# Patient Record
Sex: FEMALE | Race: WHITE | NOT HISPANIC OR LATINO | URBAN - METROPOLITAN AREA
[De-identification: names, ages, dates, MRNs, and addresses within clinical notes are randomized per-mention and may not be internally consistent; named-entity substitution may affect disease eponyms.]

---
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
## 2021-12-14 ENCOUNTER — APPOINTMENT (RX ONLY)
Dept: URBAN - METROPOLITAN AREA CLINIC 23 | Facility: CLINIC | Age: 21
Setting detail: DERMATOLOGY
End: 2021-12-14

## 2021-12-14 DIAGNOSIS — L57.8 OTHER SKIN CHANGES DUE TO CHRONIC EXPOSURE TO NONIONIZING RADIATION: ICD-10-CM

## 2021-12-14 DIAGNOSIS — D22 MELANOCYTIC NEVI: ICD-10-CM

## 2021-12-14 DIAGNOSIS — L73.2 HIDRADENITIS SUPPURATIVA: ICD-10-CM

## 2021-12-14 DIAGNOSIS — Z71.89 OTHER SPECIFIED COUNSELING: ICD-10-CM

## 2021-12-14 PROBLEM — D22.0 MELANOCYTIC NEVI OF LIP: Status: ACTIVE | Noted: 2021-12-14

## 2021-12-14 PROBLEM — D22.5 MELANOCYTIC NEVI OF TRUNK: Status: ACTIVE | Noted: 2021-12-14

## 2021-12-14 PROBLEM — D22.61 MELANOCYTIC NEVI OF RIGHT UPPER LIMB, INCLUDING SHOULDER: Status: ACTIVE | Noted: 2021-12-14

## 2021-12-14 PROBLEM — D22.62 MELANOCYTIC NEVI OF LEFT UPPER LIMB, INCLUDING SHOULDER: Status: ACTIVE | Noted: 2021-12-14

## 2021-12-14 PROBLEM — D22.4 MELANOCYTIC NEVI OF SCALP AND NECK: Status: ACTIVE | Noted: 2021-12-14

## 2021-12-14 PROCEDURE — ? OBSERVATION AND MEASURE

## 2021-12-14 PROCEDURE — 99214 OFFICE O/P EST MOD 30 MIN: CPT

## 2021-12-14 PROCEDURE — ? PRESCRIPTION

## 2021-12-14 PROCEDURE — ? COUNSELING

## 2021-12-14 RX ORDER — DOXYCYCLINE HYCLATE 100 MG/1
CAPSULE, GELATIN COATED ORAL
Qty: 60 | Refills: 5 | Status: ERX | COMMUNITY
Start: 2021-12-14

## 2021-12-14 RX ADMIN — DOXYCYCLINE HYCLATE: 100 CAPSULE, GELATIN COATED ORAL at 00:00

## 2021-12-14 ASSESSMENT — LOCATION ZONE DERM
LOCATION ZONE: LEG
LOCATION ZONE: HAND
LOCATION ZONE: LIP
LOCATION ZONE: TRUNK
LOCATION ZONE: SCALP
LOCATION ZONE: ARM

## 2021-12-14 ASSESSMENT — LOCATION SIMPLE DESCRIPTION DERM
LOCATION SIMPLE: LEFT UPPER BACK
LOCATION SIMPLE: RIGHT LIP
LOCATION SIMPLE: SCALP
LOCATION SIMPLE: RIGHT THIGH
LOCATION SIMPLE: LEFT HAND
LOCATION SIMPLE: RIGHT SHOULDER
LOCATION SIMPLE: LEFT INGUINAL FOLD
LOCATION SIMPLE: LEFT LOWER EXTREMITY
LOCATION SIMPLE: RIGHT FOREARM
LOCATION SIMPLE: RIGHT INGUINAL FOLD

## 2021-12-14 ASSESSMENT — LOCATION DETAILED DESCRIPTION DERM
LOCATION DETAILED: LEFT MEDIAL THIGH
LOCATION DETAILED: LEFT CENTRAL FRONTAL SCALP
LOCATION DETAILED: RIGHT POSTERIOR SHOULDER
LOCATION DETAILED: LEFT SUPERIOR UPPER BACK
LOCATION DETAILED: LEFT INGUINAL FOLD
LOCATION DETAILED: RIGHT INGUINAL FOLD
LOCATION DETAILED: LEFT RADIAL DORSAL HAND
LOCATION DETAILED: RIGHT ANTERIOR PROXIMAL THIGH
LOCATION DETAILED: RIGHT PROXIMAL RADIAL DORSAL FOREARM
LOCATION DETAILED: RIGHT INFERIOR VERMILION LIP

## 2021-12-14 NOTE — PROCEDURE: COUNSELING
Detail Level: Zone
Detail Level: Detailed
Detail Level: Generalized
Patient Specific Counseling (Will Not Stick From Patient To Patient): Disc often needs multiple Tx modalities. Abx, humira, topicals, injections, laser hair removal, etc. Pt would like to start joycelyn Worrell

## 2022-01-14 ENCOUNTER — HOSPITAL ENCOUNTER (OUTPATIENT)
Dept: SLEEP MEDICINE | Age: 22
Discharge: HOME | End: 2022-01-14
Payer: COMMERCIAL

## 2022-01-14 PROCEDURE — 95806 SLEEP STUDY UNATT&RESP EFFT: CPT

## 2022-02-07 PROBLEM — G47.10 HYPERSOMNIA: Status: ACTIVE | Noted: 2022-02-07

## 2022-02-07 PROBLEM — G47.34 NOCTURNAL HYPOXEMIA: Status: ACTIVE | Noted: 2022-02-07

## 2022-03-18 PROBLEM — G43.909 MIGRAINES: Status: ACTIVE | Noted: 2020-01-01

## 2022-03-18 PROBLEM — G47.10 HYPERSOMNIA: Status: ACTIVE | Noted: 2022-02-07

## 2022-03-20 PROBLEM — G47.34 NOCTURNAL HYPOXEMIA: Status: ACTIVE | Noted: 2022-02-07

## 2022-06-09 NOTE — PROGRESS NOTES
Renny Deawyatt Corral, [address] [place] [address] Court, [address] [facility]  [phone]    Patient Name:  Jerry Curran  YOB: 2000      Office Visit 6/10/2022    CHIEF COMPLAINT:    Chief Complaint   Patient presents with    Sleep Apnea         HISTORY OF PRESENT ILLNESS:  Patient is a 24 yo female seen today for follow up of sleep apnea and hypersomnia. She was diagnosed with sleep apnea a few years ago but had not used Pap therapy for about two years. Home sleep test completed 1/16/2022 with AHI 15.3 events per hour with desaturations to 85%. She was started on auto CPAP set to a pressure of 7 to 15 cm. Download reveals 84% compliance on therapy with average nightly use 4.5 hours. AHI is down to 2.5 events per hour. She is using an average pressure between nine and 11 cm. She has noticed some improvement and daytime sleepiness. Millburn score is down to seven. It was 11 at last visit. She has noticed that she is no longer waking in the night to go to the bathroom. Prior to CPAP, she was waking four times nightly with nocturia. She naps on average once per week for about an hour. She would like to continue Pap therapy with a full facemask. She is taking the mask off in the night not knowing she is doing this. She does have a leak occasionally. She sleeps laterally. We discussed the CPAP pillow may help. Will adjust pressure to APAP 7 to 10 cm to see if this will help with leak and being able to keep her machine on.                  Past Medical History:   Diagnosis Date    Acid reflux 2010    Anxiety and depression     f/w psych    Gastroparesis 2017    Hives     Migraines 2020    resolved on their own 2021    ZEKE (obstructive sleep apnea)     moderate 1/2022. f/w sleep med         Patient Active Problem List   Diagnosis    ZEKE (obstructive sleep apnea)    Hypersomnia    Migraines    Acid reflux    Anxiety and depression    Hypercholesterolemia    Nocturnal hypoxemia          Past Surgical History:   Procedure Laterality Date    CHOLECYSTECTOMY, LAPAROSCOPIC  02/2017           Social History     Socioeconomic History    Marital status: Single     Spouse name: Not on file    Number of children: Not on file    Years of education: Not on file    Highest education level: Not on file   Occupational History    Not on file   Tobacco Use    Smoking status: Never Smoker    Smokeless tobacco: Never Used   Substance and Sexual Activity    Alcohol use: Not on file    Drug use: Not on file    Sexual activity: Not on file   Other Topics Concern    Not on file   Social History Narrative    Not on file     Social Determinants of Health     Financial Resource Strain:     Difficulty of Paying Living Expenses: Not on file   Food Insecurity:     Worried About Running Out of Food in the Last Year: Not on file    Mami of Food in the Last Year: Not on file   Transportation Needs:     Lack of Transportation (Medical): Not on file    Lack of Transportation (Non-Medical):  Not on file   Physical Activity:     Days of Exercise per Week: Not on file    Minutes of Exercise per Session: Not on file   Stress:     Feeling of Stress : Not on file   Social Connections:     Frequency of Communication with Friends and Family: Not on file    Frequency of Social Gatherings with Friends and Family: Not on file    Attends Cheondoism Services: Not on file    Active Member of [address] [place] [address] [employer] or Organizations: Not on file    Attends Club or Organization Meetings: Not on file    Marital Status: Not on file   Intimate Partner Violence:     Fear of Current or Ex-Partner: Not on file    Emotionally Abused: Not on file    Physically Abused: Not on file    Sexually Abused: Not on file   Housing Stability:     Unable to Pay for Housing in the Last Year: Not on file    Number of [place] in the Last Year: Not on file    Unstable Housing in the Last Year: Not on file         Family History   Problem Relation Age of Onset    Migraines Mother     Hypertension Father     Migraines Sister          No Known Allergies      Current Outpatient Medications   Medication Sig    buPROPion (WELLBUTRIN XL) 300 MG extended release tablet Take 300 mg by mouth daily    doxycycline hyclate (VIBRAMYCIN) 100 MG capsule Take 100 mg by mouth daily    norethindrone-ethinyl estradiol (JUNEL FE 1/20) 1-20 MG-MCG per tablet Take 1 tablet by mouth daily    RABEprazole (ACIPHEX) 20 MG tablet Take 20 mg by mouth 2 times daily     No current facility-administered medications for this visit. REVIEW OF SYSTEMS:   CONSTITUTIONAL:   There is no history of fever, chills, night sweats, weight loss, weight gain, persistent fatigue, or lethargy/hypersomnolence. CARDIAC:   No chest pain, pressure, discomfort, palpitations, orthopnea, murmurs, or edema. GI:   No dysphagia, heartburn reflux, nausea/vomiting, diarrhea, abdominal pain, or bleeding. NEURO:   There is no history of AMS, persistent headache, decreased level of consciousness, seizures, or motor or sensory deficits. PHYSICAL EXAM:    Vitals:    06/10/22 1140   BP: 118/84   Pulse: 90   Resp: 14   Temp: 97.9 °F (36.6 °C)   SpO2: 97%   Weight: 248 lb (112.5 kg)   Height: 5' 10\" (1.778 m)   BMI 35.58          GENERAL APPEARANCE:   The patient is obese and in no respiratory distress. HEENT:   PERRL. Conjunctivae unremarkable. Nasal mucosa is without epistaxis, exudate, or polyps. Gums and dentition are unremarkable. There is  oropharyngeal narrowing. TMs are clear. NECK/LYMPHATIC:   Symmetrical with no elevation of jugular venous pulsation. Trachea midline. No thyroid enlargement. No cervical adenopathy. LUNGS:   Normal respiratory effort with symmetrical lung expansion. Breath sounds clear bilaterally. HEART:   There is a regular rate and rhythm. No murmur, rub, or gallop. There is no edema in the lower extremities. ABDOMEN:   Soft and non-tender.   No hepatosplenomegaly. Bowel sounds are normal.     NEURO:   The patient is alert and oriented to person, place, and time. Memory appears intact and mood is normal.  No gross sensorimotor deficits are present. ASSESSMENT:  (Medical Decision Making)      Diagnosis Orders   1. ZEKE (obstructive sleep apnea) patient is using and benefiting from Pap therapy. Will adjust pressure to help with leak which should help her to be able to keep the mask on during the night. Continue nightly compliance  DME - DURABLE MEDICAL EQUIPMENT   2. Nocturnal hypoxemia -- should improved with Pap therapy  DME - DURABLE MEDICAL EQUIPMENT        PLAN:  Change pressure to APAP 7-10cm   Recommended cpap pillow for side sleep with full facemask  continue nightly compliance  follow-up will be in six months or sooner if needed        Orders Placed This Encounter   Procedures    DME - DURABLE MEDICAL EQUIPMENT     medbridge  Change pressure to APAP 7-10 cm    [facility]  Phone: [address] [address]  Dept: [phone]      Patient Name: Candi Pearson  : 2000  Gender: female  Address: [address] [place] [address]  [address]  Patient phone: [phone] (home)       Primary Insurance: Payor: Jessi Haider 150 / Plan: Abhijit Lunch / Product Type: *No Product type* /   Subscriber ID: [id number] - ([facility])      AMB Supply Order  Order Details     DME Location:   Order Date: 6/10/2022   There were no encounter diagnoses.              (  X   )Supplies Needed       apap Machine   (     ) CPAP Unit  (     ) Auto CPAP Unit  (     ) BiLevel Unit  (     ) Auto BiLevel Unit  (     ) ASV   (     ) Bilevel ST      Length of need: 12 months    Pressure:  7-10 cmH20  EPR:      Starting Ramp Pressure:   cm H20  Ramp Time: min      Patient had a diagnostic Apnea Hypopnea Index (AHI) of :    *SUPPLIES* Replace all as needed, or per coverage guidelines     Masks Type:  ( x   ) -Full Face Mask (1 per 3 mon)  (  x  ) -Full Mask (1 per month) Interface/Cushion      (  ) -Nasal Mask (1 per 3 mon)  (  ) - Nasal Mask (1 per month) Interface/Cushion  (     ) -Pillow (2 per mon)  (     ) -Jymzyontd (1 per 6 mon)            Other Supplies:    (   X  )-Xybndppq (1 per 6 mon)  (   X  )-Uifqmi Tubing (1 per 3 mon)  (   X  )- Disposable Filter (2 per mon)  (   x  )-Eeahfl Humidifier (1 per year)     ( x    )-Wnqsrsjzf (sometimes used with Full Face Mask) (1 per 6 mos)  (    )-Tubing-without heat (1 per 3 mos)  (     )-Non-Disposable Filter (1 per 6 mos)  (  x   )-Water Chamber (1 per 6 mos)  (     )-Humidifier non-heated (1 per 5 yrs)      Signed Date: 6/10/2022  Electronically Signed By: JOSE Christy CNP  Electronically Dated:  6/10/2022     No orders of the defined types were placed in this encounter. Collaborating Physician: Dr. Marie Atwood    Over 50% of today's office visit was spent in face to face time reviewing test results, prognosis, importance of compliance, education about disease process, benefits of medications, instructions for management of acute flare-ups, and follow up plans. Total face to face time spent with patient was 25 minutes.         JOSE Christy CNP  Electronically signed

## 2022-06-10 ENCOUNTER — OFFICE VISIT (OUTPATIENT)
Dept: SLEEP MEDICINE | Age: 22
End: 2022-06-10
Payer: COMMERCIAL

## 2022-06-10 VITALS
BODY MASS INDEX: 35.5 KG/M2 | SYSTOLIC BLOOD PRESSURE: 118 MMHG | HEIGHT: 70 IN | DIASTOLIC BLOOD PRESSURE: 84 MMHG | WEIGHT: 248 LBS | HEART RATE: 90 BPM | RESPIRATION RATE: 14 BRPM | TEMPERATURE: 97.9 F | OXYGEN SATURATION: 97 %

## 2022-06-10 DIAGNOSIS — G47.33 OSA (OBSTRUCTIVE SLEEP APNEA): Primary | ICD-10-CM

## 2022-06-10 DIAGNOSIS — G47.34 NOCTURNAL HYPOXEMIA: ICD-10-CM

## 2022-06-10 PROCEDURE — 99213 OFFICE O/P EST LOW 20 MIN: CPT | Performed by: NURSE PRACTITIONER

## 2022-06-10 ASSESSMENT — SLEEP AND FATIGUE QUESTIONNAIRES
HOW LIKELY ARE YOU TO NOD OFF OR FALL ASLEEP WHILE SITTING QUIETLY AFTER LUNCH WITHOUT ALCOHOL: 1
HOW LIKELY ARE YOU TO NOD OFF OR FALL ASLEEP WHILE WATCHING TV: 0
HOW LIKELY ARE YOU TO NOD OFF OR FALL ASLEEP WHEN YOU ARE A PASSENGER IN A CAR FOR AN HOUR WITHOUT A BREAK: 3
ESS TOTAL SCORE: 7
HOW LIKELY ARE YOU TO NOD OFF OR FALL ASLEEP WHILE SITTING AND READING: 0
HOW LIKELY ARE YOU TO NOD OFF OR FALL ASLEEP WHILE SITTING AND TALKING TO SOMEONE: 0
HOW LIKELY ARE YOU TO NOD OFF OR FALL ASLEEP WHILE LYING DOWN TO REST IN THE AFTERNOON WHEN CIRCUMSTANCES PERMIT: 2
HOW LIKELY ARE YOU TO NOD OFF OR FALL ASLEEP WHILE SITTING INACTIVE IN A PUBLIC PLACE: 1
HOW LIKELY ARE YOU TO NOD OFF OR FALL ASLEEP IN A CAR, WHILE STOPPED FOR A FEW MINUTES IN TRAFFIC: 0

## 2022-06-14 ENCOUNTER — APPOINTMENT (RX ONLY)
Dept: URBAN - METROPOLITAN AREA CLINIC 23 | Facility: CLINIC | Age: 22
Setting detail: DERMATOLOGY
End: 2022-06-14

## 2022-06-14 DIAGNOSIS — Z71.89 OTHER SPECIFIED COUNSELING: ICD-10-CM

## 2022-06-14 DIAGNOSIS — L57.8 OTHER SKIN CHANGES DUE TO CHRONIC EXPOSURE TO NONIONIZING RADIATION: ICD-10-CM

## 2022-06-14 DIAGNOSIS — L73.2 HIDRADENITIS SUPPURATIVA: ICD-10-CM

## 2022-06-14 DIAGNOSIS — D22 MELANOCYTIC NEVI: ICD-10-CM

## 2022-06-14 PROBLEM — D22.5 MELANOCYTIC NEVI OF TRUNK: Status: ACTIVE | Noted: 2022-06-14

## 2022-06-14 PROBLEM — D22.62 MELANOCYTIC NEVI OF LEFT UPPER LIMB, INCLUDING SHOULDER: Status: ACTIVE | Noted: 2022-06-14

## 2022-06-14 PROBLEM — D22.4 MELANOCYTIC NEVI OF SCALP AND NECK: Status: ACTIVE | Noted: 2022-06-14

## 2022-06-14 PROBLEM — D22.61 MELANOCYTIC NEVI OF RIGHT UPPER LIMB, INCLUDING SHOULDER: Status: ACTIVE | Noted: 2022-06-14

## 2022-06-14 PROBLEM — D22.0 MELANOCYTIC NEVI OF LIP: Status: ACTIVE | Noted: 2022-06-14

## 2022-06-14 PROCEDURE — 99213 OFFICE O/P EST LOW 20 MIN: CPT

## 2022-06-14 PROCEDURE — ? OTHER

## 2022-06-14 PROCEDURE — ? COUNSELING

## 2022-06-14 PROCEDURE — ? OBSERVATION AND MEASURE

## 2022-06-14 ASSESSMENT — LOCATION DETAILED DESCRIPTION DERM
LOCATION DETAILED: LEFT RADIAL DORSAL HAND
LOCATION DETAILED: LEFT SUPERIOR UPPER BACK
LOCATION DETAILED: RIGHT INGUINAL FOLD
LOCATION DETAILED: LEFT MEDIAL THIGH
LOCATION DETAILED: RIGHT PROXIMAL RADIAL DORSAL FOREARM
LOCATION DETAILED: LEFT CENTRAL FRONTAL SCALP
LOCATION DETAILED: RIGHT INFERIOR VERMILION LIP
LOCATION DETAILED: LEFT INGUINAL FOLD
LOCATION DETAILED: RIGHT POSTERIOR SHOULDER

## 2022-06-14 ASSESSMENT — LOCATION ZONE DERM
LOCATION ZONE: ARM
LOCATION ZONE: SCALP
LOCATION ZONE: LEG
LOCATION ZONE: TRUNK
LOCATION ZONE: HAND
LOCATION ZONE: LIP

## 2022-06-14 ASSESSMENT — LOCATION SIMPLE DESCRIPTION DERM
LOCATION SIMPLE: LEFT INGUINAL FOLD
LOCATION SIMPLE: RIGHT SHOULDER
LOCATION SIMPLE: RIGHT INGUINAL FOLD
LOCATION SIMPLE: RIGHT LIP
LOCATION SIMPLE: RIGHT FOREARM
LOCATION SIMPLE: LEFT LOWER EXTREMITY
LOCATION SIMPLE: SCALP
LOCATION SIMPLE: LEFT UPPER BACK
LOCATION SIMPLE: LEFT HAND

## 2022-06-14 NOTE — PROCEDURE: OTHER
Detail Level: Simple
Render Risk Assessment In Note?: no
Other (Free Text): Doing well, per pt. Will call if needs refills
Note Text (......Xxx Chief Complaint.): This diagnosis correlates with the

## 2022-11-15 ENCOUNTER — APPOINTMENT (RX ONLY)
Dept: URBAN - METROPOLITAN AREA CLINIC 23 | Facility: CLINIC | Age: 22
Setting detail: DERMATOLOGY
End: 2022-11-15

## 2022-11-15 DIAGNOSIS — Q819 OTHER SPECIFIED ANOMALIES OF SKIN: ICD-10-CM

## 2022-11-15 DIAGNOSIS — Q826 OTHER SPECIFIED ANOMALIES OF SKIN: ICD-10-CM

## 2022-11-15 DIAGNOSIS — L57.8 OTHER SKIN CHANGES DUE TO CHRONIC EXPOSURE TO NONIONIZING RADIATION: ICD-10-CM

## 2022-11-15 DIAGNOSIS — Q828 OTHER SPECIFIED ANOMALIES OF SKIN: ICD-10-CM

## 2022-11-15 DIAGNOSIS — Z71.89 OTHER SPECIFIED COUNSELING: ICD-10-CM

## 2022-11-15 DIAGNOSIS — L73.2 HIDRADENITIS SUPPURATIVA: ICD-10-CM

## 2022-11-15 DIAGNOSIS — D22 MELANOCYTIC NEVI: ICD-10-CM

## 2022-11-15 PROBLEM — D22.5 MELANOCYTIC NEVI OF TRUNK: Status: ACTIVE | Noted: 2022-11-15

## 2022-11-15 PROBLEM — D22.61 MELANOCYTIC NEVI OF RIGHT UPPER LIMB, INCLUDING SHOULDER: Status: ACTIVE | Noted: 2022-11-15

## 2022-11-15 PROBLEM — Q82.8 OTHER SPECIFIED CONGENITAL MALFORMATIONS OF SKIN: Status: ACTIVE | Noted: 2022-11-15

## 2022-11-15 PROBLEM — D22.4 MELANOCYTIC NEVI OF SCALP AND NECK: Status: ACTIVE | Noted: 2022-11-15

## 2022-11-15 PROBLEM — D22.0 MELANOCYTIC NEVI OF LIP: Status: ACTIVE | Noted: 2022-11-15

## 2022-11-15 PROBLEM — D22.62 MELANOCYTIC NEVI OF LEFT UPPER LIMB, INCLUDING SHOULDER: Status: ACTIVE | Noted: 2022-11-15

## 2022-11-15 PROCEDURE — ? OBSERVATION AND MEASURE

## 2022-11-15 PROCEDURE — 99213 OFFICE O/P EST LOW 20 MIN: CPT

## 2022-11-15 PROCEDURE — ? COUNSELING

## 2022-11-15 PROCEDURE — ? OTHER

## 2022-11-15 ASSESSMENT — LOCATION ZONE DERM
LOCATION ZONE: SCALP
LOCATION ZONE: LIP
LOCATION ZONE: ARM
LOCATION ZONE: HAND
LOCATION ZONE: LEG
LOCATION ZONE: FACE
LOCATION ZONE: TRUNK

## 2022-11-15 ASSESSMENT — LOCATION SIMPLE DESCRIPTION DERM
LOCATION SIMPLE: LEFT SHOULDER
LOCATION SIMPLE: LEFT UPPER BACK
LOCATION SIMPLE: RIGHT SHOULDER
LOCATION SIMPLE: LEFT HAND
LOCATION SIMPLE: SCALP
LOCATION SIMPLE: RIGHT INGUINAL FOLD
LOCATION SIMPLE: RIGHT FOREARM
LOCATION SIMPLE: LEFT LOWER EXTREMITY
LOCATION SIMPLE: LEFT INGUINAL FOLD
LOCATION SIMPLE: RIGHT LIP
LOCATION SIMPLE: LEFT CHEEK

## 2022-11-15 ASSESSMENT — LOCATION DETAILED DESCRIPTION DERM
LOCATION DETAILED: LEFT CENTRAL MALAR CHEEK
LOCATION DETAILED: RIGHT POSTERIOR SHOULDER
LOCATION DETAILED: RIGHT INFERIOR VERMILION LIP
LOCATION DETAILED: LEFT CENTRAL FRONTAL SCALP
LOCATION DETAILED: LEFT RADIAL DORSAL HAND
LOCATION DETAILED: LEFT MEDIAL THIGH
LOCATION DETAILED: LEFT INGUINAL FOLD
LOCATION DETAILED: LEFT POSTERIOR SHOULDER
LOCATION DETAILED: RIGHT PROXIMAL RADIAL DORSAL FOREARM
LOCATION DETAILED: RIGHT INGUINAL FOLD
LOCATION DETAILED: LEFT SUPERIOR UPPER BACK

## 2022-11-15 NOTE — PROCEDURE: COUNSELING
Detail Level: Detailed
Detail Level: Zone
Patient Specific Counseling (Will Not Stick From Patient To Patient): Samples given: ceraveSA, amlactin, eucerin dry bumpy cream, laroche posay moisturizing cream, eletone
Detail Level: Generalized

## 2022-12-12 ENCOUNTER — TELEMEDICINE (OUTPATIENT)
Dept: FAMILY MEDICINE CLINIC | Facility: CLINIC | Age: 22
End: 2022-12-12
Payer: COMMERCIAL

## 2022-12-12 DIAGNOSIS — K21.9 GASTRO-ESOPHAGEAL REFLUX DISEASE WITHOUT ESOPHAGITIS: Primary | ICD-10-CM

## 2022-12-12 PROCEDURE — 99214 OFFICE O/P EST MOD 30 MIN: CPT | Performed by: FAMILY MEDICINE

## 2022-12-12 RX ORDER — RABEPRAZOLE SODIUM 20 MG/1
20 TABLET, DELAYED RELEASE ORAL DAILY
Qty: 90 TABLET | Refills: 0 | Status: SHIPPED | OUTPATIENT
Start: 2022-12-12

## 2022-12-12 RX ORDER — FAMOTIDINE 40 MG/1
40 TABLET, FILM COATED ORAL EVERY EVENING
Qty: 90 TABLET | Refills: 0 | Status: SHIPPED | OUTPATIENT
Start: 2022-12-12

## 2022-12-12 ASSESSMENT — PATIENT HEALTH QUESTIONNAIRE - PHQ9
10. IF YOU CHECKED OFF ANY PROBLEMS, HOW DIFFICULT HAVE THESE PROBLEMS MADE IT FOR YOU TO DO YOUR WORK, TAKE CARE OF THINGS AT HOME, OR GET ALONG WITH OTHER PEOPLE: 0
1. LITTLE INTEREST OR PLEASURE IN DOING THINGS: 0
SUM OF ALL RESPONSES TO PHQ QUESTIONS 1-9: 0
4. FEELING TIRED OR HAVING LITTLE ENERGY: 0
2. FEELING DOWN, DEPRESSED OR HOPELESS: 0
SUM OF ALL RESPONSES TO PHQ9 QUESTIONS 1 & 2: 0
9. THOUGHTS THAT YOU WOULD BE BETTER OFF DEAD, OR OF HURTING YOURSELF: 0
3. TROUBLE FALLING OR STAYING ASLEEP: 0
7. TROUBLE CONCENTRATING ON THINGS, SUCH AS READING THE NEWSPAPER OR WATCHING TELEVISION: 0
SUM OF ALL RESPONSES TO PHQ QUESTIONS 1-9: 0
SUM OF ALL RESPONSES TO PHQ QUESTIONS 1-9: 0
5. POOR APPETITE OR OVEREATING: 0
6. FEELING BAD ABOUT YOURSELF - OR THAT YOU ARE A FAILURE OR HAVE LET YOURSELF OR YOUR FAMILY DOWN: 0
8. MOVING OR SPEAKING SO SLOWLY THAT OTHER PEOPLE COULD HAVE NOTICED. OR THE OPPOSITE, BEING SO FIGETY OR RESTLESS THAT YOU HAVE BEEN MOVING AROUND A LOT MORE THAN USUAL: 0
SUM OF ALL RESPONSES TO PHQ QUESTIONS 1-9: 0

## 2022-12-12 NOTE — PROGRESS NOTES
Lila Amaya is a 25 y.o. female who was seen by synchronous (real-time) audio-video technology on 12/12/2022. Subjective:   Lila Amaya was seen for Gastroesophageal Reflux (Wanting a referral to gastro)  Extensive GI hx. Last seen by GI 10/2019  HH and ulcer in the 4th grade  Cholecystectomy 2017  Gastroparesis at [address] [facility], had botox 8/2018    For the past 1.5 months is having really bad GERD regardless of what she eats or drinks. Is having a \"knot\" sensation behind her sternum after she eats and lasts all day. Currently taking aciphex once day b/c insurance doesn't cover BID  No vomiting or regurgitation of food. No Known Allergies      Objective:     General: alert, cooperative, and no distress   Mental  status: mental status: alert, oriented to person, place, and time, normal mood, behavior, speech, dress, motor activity, and thought processes   Resp: No distress. Neuro: No acute deficits   Skin: skin: no discoloration or lesions of concern on visible areas     Due to this being a TeleHealth evaluation, many elements of the physical examination are unable to be assessed. Assessment & Plan:   Maranda Argueta was seen today for gastroesophageal reflux. Diagnoses and all orders for this visit:    Gastro-esophageal reflux disease without esophagitis  -     RABEprazole (ACIPHEX) 20 MG tablet; Take 1 tablet by mouth daily  -     famotidine (PEPCID) 40 MG tablet; Take 1 tablet by mouth every evening  -     [facility]    Add pepcid qhs. Referral to GI. Reviewed GERD diet    Chronic problem exacerbated, med mgmt        CPT Codes 01653-89144 for Established Patients may apply to this Telehealth Visit    Lila Amaya was evaluated through a synchronous (real-time) audio-video encounter. The patient (or guardian if applicable) is aware that this is a billable service, which includes applicable co-pays.  This Virtual Visit was conducted with patient's (and/or leg guardian's) consent. The visit was conducted pursuant to the emergency declaration under the [facility], [facility] waiver authority and the [employer] General Act. Patient identification was verified, and a caregiver was present when appropriate. The patient was located in a state where the provider was licensed to provide care. I was at home while conducting this encounter. Pt was at home. We discussed the expected course, resolution and complications of the diagnosis(es) in detail. Medication risks, benefits, costs, interactions, and alternatives were discussed as indicated. I advised her to contact the office if her condition worsens, changes or fails to improve as anticipated. She expressed understanding with the diagnosis(es) and plan.      Zeyad Chang, DO

## 2023-02-19 SDOH — HEALTH STABILITY: PHYSICAL HEALTH: ON AVERAGE, HOW MANY DAYS PER WEEK DO YOU ENGAGE IN MODERATE TO STRENUOUS EXERCISE (LIKE A BRISK WALK)?: 2 DAYS

## 2023-02-19 SDOH — HEALTH STABILITY: PHYSICAL HEALTH: ON AVERAGE, HOW MANY MINUTES DO YOU ENGAGE IN EXERCISE AT THIS LEVEL?: 20 MIN

## 2023-02-20 ENCOUNTER — OFFICE VISIT (OUTPATIENT)
Dept: FAMILY MEDICINE CLINIC | Facility: CLINIC | Age: 23
End: 2023-02-20
Payer: COMMERCIAL

## 2023-02-20 VITALS
DIASTOLIC BLOOD PRESSURE: 86 MMHG | WEIGHT: 239 LBS | SYSTOLIC BLOOD PRESSURE: 120 MMHG | HEIGHT: 70 IN | TEMPERATURE: 98.2 F | BODY MASS INDEX: 34.22 KG/M2 | HEART RATE: 103 BPM

## 2023-02-20 DIAGNOSIS — F32.A ANXIETY AND DEPRESSION: ICD-10-CM

## 2023-02-20 DIAGNOSIS — Z83.49 FAMILY HISTORY OF GRAVES' DISEASE: ICD-10-CM

## 2023-02-20 DIAGNOSIS — F41.9 ANXIETY AND DEPRESSION: ICD-10-CM

## 2023-02-20 DIAGNOSIS — K21.9 GASTRO-ESOPHAGEAL REFLUX DISEASE WITHOUT ESOPHAGITIS: Primary | ICD-10-CM

## 2023-02-20 LAB
ALBUMIN SERPL-MCNC: 3.3 G/DL (ref 3.5–5)
ALBUMIN/GLOB SERPL: 0.8 (ref 0.4–1.6)
ALP SERPL-CCNC: 109 U/L (ref 50–136)
ALT SERPL-CCNC: 26 U/L (ref 12–65)
ANION GAP SERPL CALC-SCNC: 6 MMOL/L (ref 2–11)
AST SERPL-CCNC: 15 U/L (ref 15–37)
BASOPHILS # BLD: 0 K/UL (ref 0–0.2)
BASOPHILS NFR BLD: 1 % (ref 0–2)
BILIRUB SERPL-MCNC: 0.2 MG/DL (ref 0.2–1.1)
BUN SERPL-MCNC: 10 MG/DL (ref 6–23)
CALCIUM SERPL-MCNC: 9.5 MG/DL (ref 8.3–10.4)
CHLORIDE SERPL-SCNC: 106 MMOL/L (ref 101–110)
CO2 SERPL-SCNC: 26 MMOL/L (ref 21–32)
CREAT SERPL-MCNC: 0.9 MG/DL (ref 0.6–1)
DIFFERENTIAL METHOD BLD: ABNORMAL
EOSINOPHIL # BLD: 0 K/UL (ref 0–0.8)
EOSINOPHIL NFR BLD: 1 % (ref 0.5–7.8)
ERYTHROCYTE [DISTWIDTH] IN BLOOD BY AUTOMATED COUNT: 13.4 % (ref 11.9–14.6)
GLOBULIN SER CALC-MCNC: 4 G/DL (ref 2.8–4.5)
GLUCOSE SERPL-MCNC: 94 MG/DL (ref 65–100)
HCT VFR BLD AUTO: 39.4 % (ref 35.8–46.3)
HGB BLD-MCNC: 11.9 G/DL (ref 11.7–15.4)
IMM GRANULOCYTES # BLD AUTO: 0 K/UL (ref 0–0.5)
IMM GRANULOCYTES NFR BLD AUTO: 0 % (ref 0–5)
LYMPHOCYTES # BLD: 1.5 K/UL (ref 0.5–4.6)
LYMPHOCYTES NFR BLD: 23 % (ref 13–44)
MCH RBC QN AUTO: 25.1 PG (ref 26.1–32.9)
MCHC RBC AUTO-ENTMCNC: 30.2 G/DL (ref 31.4–35)
MCV RBC AUTO: 82.9 FL (ref 82–102)
MONOCYTES # BLD: 0.3 K/UL (ref 0.1–1.3)
MONOCYTES NFR BLD: 5 % (ref 4–12)
NEUTS SEG # BLD: 4.6 K/UL (ref 1.7–8.2)
NEUTS SEG NFR BLD: 71 % (ref 43–78)
NRBC # BLD: 0 K/UL (ref 0–0.2)
PLATELET # BLD AUTO: 337 K/UL (ref 150–450)
PMV BLD AUTO: 10.1 FL (ref 9.4–12.3)
POTASSIUM SERPL-SCNC: 4.6 MMOL/L (ref 3.5–5.1)
PROT SERPL-MCNC: 7.3 G/DL (ref 6.3–8.2)
RBC # BLD AUTO: 4.75 M/UL (ref 4.05–5.2)
SODIUM SERPL-SCNC: 138 MMOL/L (ref 133–143)
TSH, 3RD GENERATION: 2.51 UIU/ML (ref 0.36–3.74)
WBC # BLD AUTO: 6.5 K/UL (ref 4.3–11.1)

## 2023-02-20 PROCEDURE — 99213 OFFICE O/P EST LOW 20 MIN: CPT | Performed by: NURSE PRACTITIONER

## 2023-02-20 RX ORDER — ONDANSETRON 4 MG/1
TABLET, ORALLY DISINTEGRATING ORAL
COMMUNITY
Start: 2023-01-25

## 2023-02-20 SDOH — ECONOMIC STABILITY: INCOME INSECURITY: IN THE LAST 12 MONTHS, WAS THERE A TIME WHEN YOU WERE NOT ABLE TO PAY THE MORTGAGE OR RENT ON TIME?: NO

## 2023-02-20 SDOH — ECONOMIC STABILITY: INCOME INSECURITY: HOW HARD IS IT FOR YOU TO PAY FOR THE VERY BASICS LIKE FOOD, HOUSING, MEDICAL CARE, AND HEATING?: NOT HARD AT ALL

## 2023-02-20 SDOH — HEALTH STABILITY: PHYSICAL HEALTH: ON AVERAGE, HOW MANY MINUTES DO YOU ENGAGE IN EXERCISE AT THIS LEVEL?: 30 MIN

## 2023-02-20 SDOH — ECONOMIC STABILITY: TRANSPORTATION INSECURITY
IN THE PAST 12 MONTHS, HAS LACK OF TRANSPORTATION KEPT YOU FROM MEETINGS, WORK, OR FROM GETTING THINGS NEEDED FOR DAILY LIVING?: NO

## 2023-02-20 SDOH — ECONOMIC STABILITY: TRANSPORTATION INSECURITY
IN THE PAST 12 MONTHS, HAS THE LACK OF TRANSPORTATION KEPT YOU FROM MEDICAL APPOINTMENTS OR FROM GETTING MEDICATIONS?: NO

## 2023-02-20 SDOH — ECONOMIC STABILITY: HOUSING INSECURITY
IN THE LAST 12 MONTHS, WAS THERE A TIME WHEN YOU DID NOT HAVE A STEADY PLACE TO SLEEP OR SLEPT IN A SHELTER (INCLUDING NOW)?: NO

## 2023-02-20 SDOH — HEALTH STABILITY: PHYSICAL HEALTH: ON AVERAGE, HOW MANY DAYS PER WEEK DO YOU ENGAGE IN MODERATE TO STRENUOUS EXERCISE (LIKE A BRISK WALK)?: 2 DAYS

## 2023-02-20 SDOH — ECONOMIC STABILITY: FOOD INSECURITY: WITHIN THE PAST 12 MONTHS, YOU WORRIED THAT YOUR FOOD WOULD RUN OUT BEFORE YOU GOT MONEY TO BUY MORE.: NEVER TRUE

## 2023-02-20 SDOH — ECONOMIC STABILITY: FOOD INSECURITY: WITHIN THE PAST 12 MONTHS, THE FOOD YOU BOUGHT JUST DIDN'T LAST AND YOU DIDN'T HAVE MONEY TO GET MORE.: NEVER TRUE

## 2023-02-20 ASSESSMENT — SOCIAL DETERMINANTS OF HEALTH (SDOH)
HOW OFTEN DO YOU GET TOGETHER WITH FRIENDS OR RELATIVES?: MORE THAN THREE TIMES A WEEK
DO YOU BELONG TO ANY CLUBS OR ORGANIZATIONS SUCH AS CHURCH GROUPS UNIONS, FRATERNAL OR ATHLETIC GROUPS, OR SCHOOL GROUPS?: NO
HOW OFTEN DO YOU ATTEND CHURCH OR RELIGIOUS SERVICES?: NEVER
WITHIN THE LAST YEAR, HAVE YOU BEEN KICKED, HIT, SLAPPED, OR OTHERWISE PHYSICALLY HURT BY YOUR PARTNER OR EX-PARTNER?: NO
HOW OFTEN DO YOU ATTENT MEETINGS OF THE CLUB OR ORGANIZATION YOU BELONG TO?: NEVER
WITHIN THE LAST YEAR, HAVE YOU BEEN AFRAID OF YOUR PARTNER OR EX-PARTNER?: NO
ARE YOU MARRIED, WIDOWED, DIVORCED, SEPARATED, NEVER MARRIED, OR LIVING WITH A PARTNER?: NEVER MARRIED
IN A TYPICAL WEEK, HOW MANY TIMES DO YOU TALK ON THE PHONE WITH FAMILY, FRIENDS, OR NEIGHBORS?: MORE THAN THREE TIMES A WEEK
WITHIN THE LAST YEAR, HAVE YOU BEEN HUMILIATED OR EMOTIONALLY ABUSED IN OTHER WAYS BY YOUR PARTNER OR EX-PARTNER?: NO
WITHIN THE LAST YEAR, HAVE TO BEEN RAPED OR FORCED TO HAVE ANY KIND OF SEXUAL ACTIVITY BY YOUR PARTNER OR EX-PARTNER?: NO

## 2023-02-20 ASSESSMENT — LIFESTYLE VARIABLES
HOW OFTEN DO YOU HAVE A DRINK CONTAINING ALCOHOL: NEVER
HOW MANY STANDARD DRINKS CONTAINING ALCOHOL DO YOU HAVE ON A TYPICAL DAY: PATIENT DOES NOT DRINK

## 2023-02-20 ASSESSMENT — ENCOUNTER SYMPTOMS
TROUBLE SWALLOWING: 1
CONSTIPATION: 1
ABDOMINAL PAIN: 1
NAUSEA: 1

## 2023-02-20 NOTE — PROGRESS NOTES
Subjective:      Patient ID: Elizabeth Celeste is a 26 y.o. female. Here for  established as a new pt. Prior provider leaving practice Wellbutrin working well since 2017 for her anxiety and depression. Doing well at present on this med wishes to continue. Is managed by Psych for mental health. Does not see a therapist.Has in the past.   She is here because her psych is closing her practice. May need a bridge potentially with her meds. New pych provider will be All Botello NP. Sees Gyn dr Darwin Betancourt for her BCP  Has an upcoming apt with GI for issues with her stomach that have livan present long term. Wishes to have some labs checked if possible  HPI    Review of Systems   HENT:  Positive for trouble swallowing. Gastrointestinal:  Positive for abdominal pain, constipation and nausea. Objective:   Physical Exam  Vitals and nursing note reviewed. Constitutional:       Appearance: Normal appearance. Cardiovascular:      Rate and Rhythm: Normal rate and regular rhythm. Pulses: Normal pulses. Heart sounds: Normal heart sounds. Pulmonary:      Effort: Pulmonary effort is normal.      Breath sounds: Normal breath sounds. Musculoskeletal:         General: Normal range of motion. Skin:     General: Skin is warm and dry. Neurological:      General: No focal deficit present. Mental Status: She is alert and oriented to person, place, and time. Psychiatric:         Mood and Affect: Mood normal.         Behavior: Behavior normal.         Thought Content: Thought content normal.         Judgment: Judgment normal.       Assessment:      /86 (Site: Left Upper Arm, Position: Sitting, Cuff Size: Large Adult)   Pulse (!) 103   Temp 98.2 °F (36.8 °C) (Temporal)   Ht 5' 10\" (1.778 m)   Wt 239 lb (108.4 kg)   BMI 34.29 kg/m²        Plan:      1. Gastro-esophageal reflux disease without esophagitis  -     CBC with Auto Differential; Future  -     Comprehensive Metabolic Panel; Future  2. Anxiety and depression  3. Family history of Graves' disease  -     TSH; Future    Checking labs as requested Hope her GI workup goes well. If has any issues with meds until seen by new provider let us know.  Follow up Glenn 59, APRN - NP

## 2023-02-21 ENCOUNTER — TELEPHONE (OUTPATIENT)
Dept: FAMILY MEDICINE CLINIC | Facility: CLINIC | Age: 23
End: 2023-02-21

## 2023-11-09 ENCOUNTER — OFFICE VISIT (OUTPATIENT)
Dept: FAMILY MEDICINE CLINIC | Facility: CLINIC | Age: 23
End: 2023-11-09

## 2023-11-09 VITALS
DIASTOLIC BLOOD PRESSURE: 84 MMHG | HEART RATE: 87 BPM | SYSTOLIC BLOOD PRESSURE: 118 MMHG | BODY MASS INDEX: 34.36 KG/M2 | TEMPERATURE: 98.2 F | HEIGHT: 70 IN | WEIGHT: 240 LBS

## 2023-11-09 DIAGNOSIS — K21.9 GASTRO-ESOPHAGEAL REFLUX DISEASE WITHOUT ESOPHAGITIS: Primary | ICD-10-CM

## 2023-11-09 DIAGNOSIS — Z23 NEED FOR INFLUENZA VACCINATION: ICD-10-CM

## 2023-11-09 DIAGNOSIS — K59.00 CONSTIPATION, UNSPECIFIED CONSTIPATION TYPE: ICD-10-CM

## 2023-11-09 RX ORDER — FAMOTIDINE 40 MG/1
40 TABLET, FILM COATED ORAL EVERY EVENING
Qty: 90 TABLET | Refills: 0 | Status: SHIPPED | OUTPATIENT
Start: 2023-11-09

## 2023-11-09 RX ORDER — ONDANSETRON 4 MG/1
4 TABLET, ORALLY DISINTEGRATING ORAL EVERY 8 HOURS PRN
Qty: 12 TABLET | Refills: 1 | Status: SHIPPED | OUTPATIENT
Start: 2023-11-09

## 2023-11-09 RX ORDER — ONDANSETRON 4 MG/1
8 TABLET, ORALLY DISINTEGRATING ORAL EVERY 8 HOURS PRN
Status: CANCELLED | OUTPATIENT
Start: 2023-11-09

## 2023-11-09 ASSESSMENT — PATIENT HEALTH QUESTIONNAIRE - PHQ9
1. LITTLE INTEREST OR PLEASURE IN DOING THINGS: NOT AT ALL
2. FEELING DOWN, DEPRESSED OR HOPELESS: 0
9. THOUGHTS THAT YOU WOULD BE BETTER OFF DEAD, OR OF HURTING YOURSELF: NOT AT ALL
7. TROUBLE CONCENTRATING ON THINGS, SUCH AS READING THE NEWSPAPER OR WATCHING TELEVISION: NOT AT ALL
10. IF YOU CHECKED OFF ANY PROBLEMS, HOW DIFFICULT HAVE THESE PROBLEMS MADE IT FOR YOU TO DO YOUR WORK, TAKE CARE OF THINGS AT HOME, OR GET ALONG WITH OTHER PEOPLE: NOT DIFFICULT AT ALL
6. FEELING BAD ABOUT YOURSELF - OR THAT YOU ARE A FAILURE OR HAVE LET YOURSELF OR YOUR FAMILY DOWN: NOT AT ALL
4. FEELING TIRED OR HAVING LITTLE ENERGY: 1
8. MOVING OR SPEAKING SO SLOWLY THAT OTHER PEOPLE COULD HAVE NOTICED. OR THE OPPOSITE, BEING SO FIGETY OR RESTLESS THAT YOU HAVE BEEN MOVING AROUND A LOT MORE THAN USUAL: 0
9. THOUGHTS THAT YOU WOULD BE BETTER OFF DEAD, OR OF HURTING YOURSELF: 0
SUM OF ALL RESPONSES TO PHQ QUESTIONS 1-9: 1
3. TROUBLE FALLING OR STAYING ASLEEP: 0
1. LITTLE INTEREST OR PLEASURE IN DOING THINGS: 0
5. POOR APPETITE OR OVEREATING: NOT AT ALL
4. FEELING TIRED OR HAVING LITTLE ENERGY: SEVERAL DAYS
SUM OF ALL RESPONSES TO PHQ9 QUESTIONS 1 & 2: 0
5. POOR APPETITE OR OVEREATING: 0
3. TROUBLE FALLING OR STAYING ASLEEP: NOT AT ALL
7. TROUBLE CONCENTRATING ON THINGS, SUCH AS READING THE NEWSPAPER OR WATCHING TELEVISION: 0
8. MOVING OR SPEAKING SO SLOWLY THAT OTHER PEOPLE COULD HAVE NOTICED. OR THE OPPOSITE - BEING SO FIDGETY OR RESTLESS THAT YOU HAVE BEEN MOVING AROUND A LOT MORE THAN USUAL: NOT AT ALL
SUM OF ALL RESPONSES TO PHQ QUESTIONS 1-9: 1
SUM OF ALL RESPONSES TO PHQ QUESTIONS 1-9: 1
10. IF YOU CHECKED OFF ANY PROBLEMS, HOW DIFFICULT HAVE THESE PROBLEMS MADE IT FOR YOU TO DO YOUR WORK, TAKE CARE OF THINGS AT HOME, OR GET ALONG WITH OTHER PEOPLE: 0
SUM OF ALL RESPONSES TO PHQ QUESTIONS 1-9: 1
SUM OF ALL RESPONSES TO PHQ QUESTIONS 1-9: 1
6. FEELING BAD ABOUT YOURSELF - OR THAT YOU ARE A FAILURE OR HAVE LET YOURSELF OR YOUR FAMILY DOWN: 0
2. FEELING DOWN, DEPRESSED OR HOPELESS: NOT AT ALL

## 2023-11-09 ASSESSMENT — ENCOUNTER SYMPTOMS
BLOOD IN STOOL: 0
DIARRHEA: 1
CONSTIPATION: 1
VOMITING: 0
NAUSEA: 1
ABDOMINAL PAIN: 1

## 2023-11-09 NOTE — PROGRESS NOTES
short Rx of Zofran but not at the dosages she states she was using. Asked her to please contact gi ASAP and get an apt S I would prefer with her chronic issues that she continue care with them Is urged to treat constipation as that worsens reflux and nausea take metamucil and colace daily .          Maira Estrada, APRN - NP

## 2024-01-08 ENCOUNTER — APPOINTMENT (RX ONLY)
Dept: URBAN - METROPOLITAN AREA CLINIC 23 | Facility: CLINIC | Age: 24
Setting detail: DERMATOLOGY
End: 2024-01-08

## 2024-01-08 DIAGNOSIS — L73.9 FOLLICULAR DISORDER, UNSPECIFIED: ICD-10-CM | Status: INADEQUATELY CONTROLLED

## 2024-01-08 DIAGNOSIS — L663 OTHER SPECIFIED DISEASES OF HAIR AND HAIR FOLLICLES: ICD-10-CM | Status: INADEQUATELY CONTROLLED

## 2024-01-08 DIAGNOSIS — D22 MELANOCYTIC NEVI: ICD-10-CM

## 2024-01-08 DIAGNOSIS — L57.8 OTHER SKIN CHANGES DUE TO CHRONIC EXPOSURE TO NONIONIZING RADIATION: ICD-10-CM

## 2024-01-08 DIAGNOSIS — Z80.8 FAMILY HISTORY OF MALIGNANT NEOPLASM OF OTHER ORGANS OR SYSTEMS: ICD-10-CM

## 2024-01-08 DIAGNOSIS — L738 OTHER SPECIFIED DISEASES OF HAIR AND HAIR FOLLICLES: ICD-10-CM | Status: INADEQUATELY CONTROLLED

## 2024-01-08 PROBLEM — D22.62 MELANOCYTIC NEVI OF LEFT UPPER LIMB, INCLUDING SHOULDER: Status: ACTIVE | Noted: 2024-01-08

## 2024-01-08 PROBLEM — L02.224 FURUNCLE OF GROIN: Status: ACTIVE | Noted: 2024-01-08

## 2024-01-08 PROBLEM — D22.4 MELANOCYTIC NEVI OF SCALP AND NECK: Status: ACTIVE | Noted: 2024-01-08

## 2024-01-08 PROBLEM — D22.5 MELANOCYTIC NEVI OF TRUNK: Status: ACTIVE | Noted: 2024-01-08

## 2024-01-08 PROBLEM — D22.61 MELANOCYTIC NEVI OF RIGHT UPPER LIMB, INCLUDING SHOULDER: Status: ACTIVE | Noted: 2024-01-08

## 2024-01-08 PROBLEM — D22.71 MELANOCYTIC NEVI OF RIGHT LOWER LIMB, INCLUDING HIP: Status: ACTIVE | Noted: 2024-01-08

## 2024-01-08 PROCEDURE — 99213 OFFICE O/P EST LOW 20 MIN: CPT

## 2024-01-08 PROCEDURE — ? COUNSELING

## 2024-01-08 PROCEDURE — ? PRESCRIPTION MEDICATION MANAGEMENT

## 2024-01-08 PROCEDURE — ? PRESCRIPTION

## 2024-01-08 RX ORDER — CLINDAMYCIN PHOSPHATE 10 MG/ML
SOLUTION TOPICAL
Qty: 60 | Refills: 11 | Status: ERX | COMMUNITY
Start: 2024-01-08

## 2024-01-08 RX ADMIN — CLINDAMYCIN PHOSPHATE: 10 SOLUTION TOPICAL at 00:00

## 2024-01-08 ASSESSMENT — LOCATION ZONE DERM
LOCATION ZONE: SCALP
LOCATION ZONE: HAND
LOCATION ZONE: LEG
LOCATION ZONE: TRUNK
LOCATION ZONE: ARM

## 2024-01-08 ASSESSMENT — LOCATION DETAILED DESCRIPTION DERM
LOCATION DETAILED: LEFT SUPERIOR PARIETAL SCALP
LOCATION DETAILED: LEFT POSTERIOR SHOULDER
LOCATION DETAILED: LEFT RADIAL DORSAL HAND
LOCATION DETAILED: RIGHT INGUINAL CREASE
LOCATION DETAILED: RIGHT PROXIMAL POSTERIOR THIGH
LOCATION DETAILED: RIGHT POSTERIOR SHOULDER
LOCATION DETAILED: LEFT INGUINAL CREASE
LOCATION DETAILED: LEFT PROXIMAL DORSAL FOREARM
LOCATION DETAILED: RIGHT PROXIMAL DORSAL FOREARM
LOCATION DETAILED: INFERIOR THORACIC SPINE

## 2024-01-08 ASSESSMENT — LOCATION SIMPLE DESCRIPTION DERM
LOCATION SIMPLE: RIGHT POSTERIOR THIGH
LOCATION SIMPLE: LEFT SHOULDER
LOCATION SIMPLE: RIGHT FOREARM
LOCATION SIMPLE: LEFT FOREARM
LOCATION SIMPLE: SCALP
LOCATION SIMPLE: UPPER BACK
LOCATION SIMPLE: RIGHT SHOULDER
LOCATION SIMPLE: GROIN
LOCATION SIMPLE: LEFT HAND

## 2024-01-08 ASSESSMENT — SEVERITY ASSESSMENT: SEVERITY: MILD

## 2024-01-08 ASSESSMENT — BSA RASH: BSA RASH: 3

## 2024-01-08 NOTE — PROCEDURE: PRESCRIPTION MEDICATION MANAGEMENT
Initiate Treatment: Clindamycin topical solution prn.
Detail Level: Zone
Render In Strict Bullet Format?: No

## 2024-01-09 ENCOUNTER — OFFICE VISIT (OUTPATIENT)
Dept: FAMILY MEDICINE CLINIC | Facility: CLINIC | Age: 24
End: 2024-01-09
Payer: COMMERCIAL

## 2024-01-09 VITALS
SYSTOLIC BLOOD PRESSURE: 118 MMHG | BODY MASS INDEX: 35.22 KG/M2 | HEART RATE: 102 BPM | WEIGHT: 246 LBS | TEMPERATURE: 98 F | HEIGHT: 70 IN | DIASTOLIC BLOOD PRESSURE: 80 MMHG

## 2024-01-09 DIAGNOSIS — E66.01 SEVERE OBESITY (BMI 35.0-39.9) WITH COMORBIDITY (HCC): ICD-10-CM

## 2024-01-09 DIAGNOSIS — G89.29 CHRONIC RIGHT-SIDED LOW BACK PAIN WITHOUT SCIATICA: Primary | ICD-10-CM

## 2024-01-09 DIAGNOSIS — M54.50 CHRONIC RIGHT-SIDED LOW BACK PAIN WITHOUT SCIATICA: Primary | ICD-10-CM

## 2024-01-09 PROCEDURE — 99214 OFFICE O/P EST MOD 30 MIN: CPT | Performed by: NURSE PRACTITIONER

## 2024-01-09 RX ORDER — CLINDAMYCIN PHOSPHATE 11.9 MG/ML
SOLUTION TOPICAL
COMMUNITY
Start: 2024-01-08

## 2024-01-09 RX ORDER — NAPROXEN 250 MG/1
250 TABLET ORAL 2 TIMES DAILY WITH MEALS
Qty: 60 TABLET | Refills: 3 | Status: SHIPPED | OUTPATIENT
Start: 2024-01-09

## 2024-01-09 ASSESSMENT — PATIENT HEALTH QUESTIONNAIRE - PHQ9: DEPRESSION UNABLE TO ASSESS: PT REFUSES

## 2024-01-09 NOTE — PROGRESS NOTES
Plan:      1. Chronic right-sided low back pain without sciatica  -     XR LUMBAR SPINE (2-3 VIEWS); Future  -     naproxen (NAPROSYN) 250 MG tablet; Take 1 tablet by mouth 2 times daily (with meals), Disp-60 tablet, R-3Normal  -     [facility], [facility]  2. Severe obesity (BMI 35.0-39.9) with comorbidity (HCC)      Urged to improve diet and activity to promote weight loss as is possbile.   Xray as long duration of pain. Referral to pt rx for pain.Failure to improve, or worsening symptoms return.      JOSE Yates - NP

## 2024-01-10 ENCOUNTER — TELEPHONE (OUTPATIENT)
Dept: FAMILY MEDICINE CLINIC | Facility: CLINIC | Age: 24
End: 2024-01-10

## 2024-01-10 DIAGNOSIS — G89.29 CHRONIC RIGHT-SIDED LOW BACK PAIN WITHOUT SCIATICA: ICD-10-CM

## 2024-01-10 DIAGNOSIS — M54.50 CHRONIC RIGHT-SIDED LOW BACK PAIN WITHOUT SCIATICA: ICD-10-CM

## 2024-01-16 ENCOUNTER — HOSPITAL ENCOUNTER (OUTPATIENT)
Dept: PHYSICAL THERAPY | Age: 24
Setting detail: RECURRING SERIES
Discharge: HOME | End: 2024-01-19
Payer: COMMERCIAL

## 2024-01-16 DIAGNOSIS — M54.51 VERTEBROGENIC LOW BACK PAIN: ICD-10-CM

## 2024-01-16 DIAGNOSIS — M54.59 OTHER LOW BACK PAIN: Primary | ICD-10-CM

## 2024-01-16 PROCEDURE — 97140 MANUAL THERAPY 1/> REGIONS: CPT

## 2024-01-16 PROCEDURE — 97161 PT EVAL LOW COMPLEX 20 MIN: CPT

## 2024-01-16 PROCEDURE — 97110 THERAPEUTIC EXERCISES: CPT

## 2024-01-16 ASSESSMENT — PAIN SCALES - GENERAL: PAINLEVEL_OUTOF10: 3

## 2024-01-16 NOTE — PROGRESS NOTES
Planks forearms 10x10\"     Pallof      Clamshell with hold      Bridge with hold      Hamstring isometric vs table             MANUAL THERAPY: (- minutes):   Joint mobilization, Soft tissue mobilization, and Manipulation was utilized and necessary because of the patient's restricted joint motion, painful spasm, and loss of articular motion.       Treatment/Session Summary:    Treatment Assessment:  Idiopathic onset of low back pain.  Pain is not worse or better with any movements.  Pain is not worse or better at certain times of day, but per patient it is completely sporadic.  Recommending stability exercises as she is HYPERFLEXIBLE. Kate Danos likely.  Verbalized understanding of HEP and POC.      Communication/Consultation:  Therapy Evaluation sent to referring provider  Equipment provided today:  HEP  Recommendations/Intent for next treatment session: Next visit will focus on current LOF.    >Total Treatment Billable Duration:  24 minutes   Time In: 1520  Time Out: 1600    Rekha Mo, PT         Charge Capture  [email or web address] Portal  Appt Desk     No future appointments.

## 2024-01-16 NOTE — THERAPY EVALUATION
Elizabeth Adelina  : 2000  Primary: Prieto Sc (Luis A ALEMAN)  Secondary:  [facility][note 3[facility] @ [facility]  [address] [place] [address]  Phone: [phone]  Fax: [phone] Plan Frequency: 2-3x week    Plan of Care/Certification Expiration Date: 04/15/24        Plan of Care/Certification Expiration Date:  Plan of Care/Certification Expiration Date: 04/15/24    Frequency/Duration: Plan Frequency: 2-3x week      Time In/Out:   Time In: 1520  Time Out: 1600      PT Visit Info:    Plan Frequency: 2-3x week      Visit Count:  1                OUTPATIENT PHYSICAL THERAPY:             Initial Assessment 2024               Episode (low back pain)         Treatment Diagnosis:     Other low back pain  Vertebrogenic low back pain  Medical/Referring Diagnosis:    Chronic right-sided low back pain without sciatica [M54.50, G89.29]    Referring Physician:  Nuvia Landry APRN - NP MD Orders:  PT Eval and Treat   Return MD Appt:    No future appointments.      Date of Onset:    8 weeks ago or so (2 months)  Allergies:  Patient has no known allergies.  Restrictions/Precautions:    None      Medications Last Reviewed:  2024     SUBJECTIVE   History of Injury/Illness (Reason for Referral):  I have always had back pain since age 11.  I've always been weirdly flexible and I think because of that I think I've been in pain.  I have gastroparesis and I've always been able to do a full split without stretching.  Mainly my knees and hips have bothered me.  It feels like my ankles are going to collapse on me.  About 8 weeks ago, my back started hurting.  Last Monday I woke up unable to move because of back - this was out of no where.  I get headaches when it rains.  After last Monday I've been in and out of pain.  Monday was consistent pain and I am still uncomfortable.  It's the first couple of steps and when I get weight off my right leg that it really hurts.  The pain is with lifting my

## 2024-01-23 ENCOUNTER — HOSPITAL ENCOUNTER (OUTPATIENT)
Dept: PHYSICAL THERAPY | Age: 24
Setting detail: RECURRING SERIES
Discharge: HOME | End: 2024-01-26
Payer: COMMERCIAL

## 2024-01-23 PROCEDURE — 97110 THERAPEUTIC EXERCISES: CPT

## 2024-01-23 NOTE — PROGRESS NOTES
Progressed resistance, range, and complexity of movement as indicated.    RECOMMENDING STABILITY EXERCISES AND CORE     Date:  1/16/23 Date:   Date:  1/23/24 Date     Activity/Exercise Parameters Parameters Parameters    POC and education  On stability       Hip Iso  10x10\"      Planks forearms 10x10\"      Pallof       Clamshell with hold       Bridge with hold   2 x 10 B     Hamstring isometric vs table        Side planks    3 x 5 sec B    Sit to stand   2  x 10    Nu step   X 10 min    Dead bugs   2 x 20    Core exercises reverse bear crawl    3 x 20 sec    Bridges with leg ext   2 x 10 B          MANUAL THERAPY: (- minutes):   Joint mobilization, Soft tissue mobilization, and Manipulation was utilized and necessary because of the patient's restricted joint motion, painful spasm, and loss of articular motion.       Treatment/Session Summary:    Treatment Assessment: Patient tolerated treatment well today. Needs to work on core strength and LE strength to help with stability. Instructed patient to continue home exercises as directed.  Verbalized understanding of HEP and POC.      Communication/Consultation:  Therapy Evaluation sent to referring provider  Equipment provided today:  HEP  Recommendations/Intent for next treatment session: Next visit will focus on current LOF.    >Total Treatment Billable Duration:  45 minutes   Time In: 0845  Time Out: 0930    Rekha Beasley PTA         Charge Capture  [employer] Portal  Appt Desk     Future Appointments   Date Time Provider Department Center   1/25/2024  1:00 PM Rekha Mo PT [facility]   1/29/2024  3:15 PM Rekha Mo PT [facility]   1/31/2024  3:15 PM Rekha Beasley PTA [facility]   2/5/2024  4:00 PM Rekha Beasley PTA [facility]   2/8/2024  4:00 PM Rekha Beasley PTA [facility]

## 2024-01-25 ENCOUNTER — HOSPITAL ENCOUNTER (OUTPATIENT)
Dept: PHYSICAL THERAPY | Age: 24
Setting detail: RECURRING SERIES
Discharge: HOME | End: 2024-01-28
Payer: COMMERCIAL

## 2024-01-25 PROCEDURE — 97110 THERAPEUTIC EXERCISES: CPT

## 2024-01-25 NOTE — PROGRESS NOTES
Elizabeth Zhenguire  : 2000  Primary: Prieto Arnold (Luis A ALEMAN)  Secondary:  [facility] @ [place] [address] [place] [address]  Phone: [phone]  Fax: [phone] Plan Frequency: 2-3x week  Plan of Care/Certification Expiration Date: 04/15/24        Plan of Care/Certification Expiration Date:  Plan of Care/Certification Expiration Date: 04/15/24    Frequency/Duration: Plan Frequency: 2-3x week      Time In/Out:   Time In: 1252  Time Out: 1345      PT Visit Info:    Plan Frequency: 2-3x week      Visit Count:  3    OUTPATIENT PHYSICAL THERAPY:   Treatment Note 2024       Episode  (low back pain)               Treatment Diagnosis:    No data found  Medical/Referring Diagnosis:    Low back pain, unspecified [M54.50]    Referring Physician:  Nuvia Landry APRN - NP MD Orders:  PT Eval and Treat   Return MD Appt:    Future Appointments   Date Time Provider Department Center   2024  3:15 PM Rekha Mo, PT [facility]   2024  3:15 PM Rekha Beasley W, PTA [facility]   2024  4:00 PM Rekha Beasley W, PTA [facility]   2024  4:00 PM RamosRekha low W, PTA [facility]          Date of Onset:  No data recorded   Allergies:   Patient has no known allergies.  Restrictions/Precautions:   None      Interventions Planned (Treatment may consist of any combination of the following):     See Assessment Note    Subjective Comments: My knees hurt only a little.    Initial Pain Level::     3-4 /10  Post Session Pain Level:       3 /10  Medications Last Reviewed:  2024  Updated Objective Findings:  None Today  Treatment   THERAPEUTIC EXERCISE: (53 minutes):    Exercises per grid below to improve mobility, strength, and balance.  Required minimal visual, verbal, and manual cues to promote proper body alignment, promote proper body posture, and promote proper body mechanics.  Progressed resistance, range, and complexity of movement as

## 2024-01-29 ENCOUNTER — HOSPITAL ENCOUNTER (OUTPATIENT)
Dept: PHYSICAL THERAPY | Age: 24
Setting detail: RECURRING SERIES
Discharge: HOME | End: 2024-02-01
Payer: COMMERCIAL

## 2024-01-29 PROCEDURE — 97110 THERAPEUTIC EXERCISES: CPT

## 2024-01-29 NOTE — PROGRESS NOTES
Elizabeth Zhenguire  : 2000  Primary: Prieto Arnold (Luis A ALEMAN)  Secondary:  [facility] @ [facility]  [address] [place] [address]  Phone: [phone]  Fax: [phone] Plan Frequency: 2-3x week  Plan of Care/Certification Expiration Date: 04/15/24        Plan of Care/Certification Expiration Date:  Plan of Care/Certification Expiration Date: 04/15/24    Frequency/Duration: Plan Frequency: 2-3x week      Time In/Out:   Time In: 1510  Time Out: 1605      PT Visit Info:    Plan Frequency: 2-3x week      Visit Count:  4    OUTPATIENT PHYSICAL THERAPY:   Treatment Note 2024       Episode  (low back pain)               Treatment Diagnosis:    No data found  Medical/Referring Diagnosis:    Low back pain, unspecified [M54.50]    Referring Physician:  Nuvia Landry APRN - NP MD Orders:  PT Eval and Treat   Return MD Appt:    Future Appointments   Date Time Provider Department Center   2024  3:15 PM Rekha Beasley W, PTA [facility]   2024  4:00 PM Rekha Beasley W, PTA [facility]   2024  4:00 PM Rekha Beasley W, PTA [facility]          Date of Onset:  No data recorded   Allergies:   Patient has no known allergies.  Restrictions/Precautions:   None      Interventions Planned (Treatment may consist of any combination of the following):     See Assessment Note    Subjective Comments: My shoulder  and my quad were very sore.    Initial Pain Level::     5 lately and a 3/10 today.  /10  Post Session Pain Level:       3 /10  Medications Last Reviewed:  2024  Updated Objective Findings:  None Today  Treatment   THERAPEUTIC EXERCISE: (53 minutes):    Exercises per grid below to improve mobility, strength, and balance.  Required minimal visual, verbal, and manual cues to promote proper body alignment, promote proper body posture, and promote proper body mechanics.  Progressed resistance, range, and complexity of movement as indicated.    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]  Plica syndrome [M67.50]    Post-op Diagnosis:  Post-Op Diagnosis Codes:     * Acute medial meniscus tear of left knee, initial encounter [S83.242A]     * Acute lateral meniscus tear of left knee, initial encounter [S83.282A]     * 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     Estimated Blood Loss: minimal         Specimens:   Specimen (12h ago, onward)    None          Discharge Note    SUMMARY     Admit Date: 12/11/2018    Discharge Date and Time:  12/11/2018 8:27 AM    Hospital Course (synopsis of major diagnoses, care, treatment, and services provided during the course of the hospital stay): Patient underwent outpatient knee surgery and was transferred to PACU in stable condition.  In PACU, patient received appropriate post-operative care and discharged home with plans for physical therapy and follow-up with the operative surgeon.    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]     * Plica syndrome [M67.50]    Disposition: Home or Self  Care    Follow Up/Patient Instructions:     Medications:  Reconciled Home Medications:      Medication List      CONTINUE taking these medications    amLODIPine 10 MG tablet  Commonly known as:  NORVASC  TAKE ONE Tablet BY MOUTH DAILY     * dextroamphetamine-amphetamine 30 MG 24 hr capsule  Commonly known as:  ADDERALL XR  TAKE TWO CAPSULES BY MOUTH EVERY DAY     * dextroamphetamine-amphetamine 30 MG 24 hr capsule  Commonly known as:  ADDERALL XR  TAKE TWO CAPSULES BY MOUTH EVERY DAY  Start taking on:  1/5/2019     * dextroamphetamine-amphetamine 30 MG 24 hr capsule  Commonly known as:  ADDERALL XR  TAKE TWO CAPSULES BY MOUTH EVERY DAY  Start taking on:  2/5/2019     hydroCHLOROthiazide 25 MG tablet  Commonly known as:  HYDRODIURIL  TAKE ONE TABLET BY MOUTH EVERY DAY     irbesartan 300 MG tablet  Commonly known as:  AVAPRO  TAKE ONE Tablet BY MOUTH DAILY     levETIRAcetam 750 MG Tab  Commonly known as:  KEPPRA  Take 1 tablet (750 mg total) by mouth 2 (two) times daily.     metoprolol succinate 25 MG 24 hr tablet  Commonly known as:  TOPROL-XL  Take 1 tablet (25 mg total) by mouth once daily.     montelukast 10 mg tablet  Commonly known as:  SINGULAIR  TAKE ONE TABLET BY MOUTH EVERY EVENING     oxyCODONE-acetaminophen 7.5-325 mg per tablet  Commonly known as:  PERCOCET  Take 1 tablet by mouth every 4 to 6 hours as needed for Pain.     paroxetine 40 MG tablet  Commonly known as:  PAXIL  TAKE ONE TABLET BY MOUTH EVERY MORNING     promethazine 25 MG tablet  Commonly known as:  PHENERGAN  Take 1 tablet (25 mg total) by mouth every 6 (six) hours as needed for Nausea.         * This list has 3 medication(s) that are the same as other medications prescribed for you. Read the directions carefully, and ask your doctor or other care provider to review them with you.            STOP taking these medications    oxyCODONE 5 MG immediate release tablet  Commonly known as:  ROXICODONE          Discharge Procedure Orders   CRUTCHES  "FOR HOME USE   Order Comments: Provide if needed     Order Specific Question Answer Comments   Type: Axillary    Height: 5' 11" (1.803 m)    Weight: 90.7 kg (199 lb 16 oz)    Does patient have medical equipment at home? none    Length of need (1-99 months): 24      Diet general     Diet general     Call MD for:  temperature >100.4     Call MD for:  persistent nausea and vomiting     Call MD for:  severe uncontrolled pain     Call MD for:  difficulty breathing, headache or visual disturbances     Call MD for:  redness, tenderness, or signs of infection (pain, swelling, redness, odor or green/yellow discharge around incision site)     Call MD for:  hives     Call MD for:  persistent dizziness or light-headedness     Ice to affected area     No driving, operating heavy equipment or signing legal documents while taking pain medication     Remove dressing in 72 hours     Call MD for:  temperature >100.4     Keep surgical extremity elevated     Shower on day dressing removed (No bath)     Non weight bearing     Follow-up Information     Gwendolyn Jeong MD. Go in 2 weeks.    Specialties:  Sports Medicine, Orthopedic Surgery  Why:  For wound re-check  Contact information:  [address] [place] [address]  [phone]                 "

## 2024-01-31 ENCOUNTER — HOSPITAL ENCOUNTER (OUTPATIENT)
Dept: PHYSICAL THERAPY | Age: 24
Setting detail: RECURRING SERIES
Discharge: HOME | End: 2024-02-03
Payer: COMMERCIAL

## 2024-01-31 PROCEDURE — 97110 THERAPEUTIC EXERCISES: CPT

## 2024-01-31 NOTE — PROGRESS NOTES
Activity/Exercise Parameters      POC and education        Hip Iso   10x10\" 10x10\" 10x10\"   Planks forearms       Pallof  Black 10x10\" Red 10x10\" Red 10x10\"   Clamshell with hold       Bridge with hold 2 x 10 B  10x10\" 10x10\" 10x10\"   Hamstring isometric vs table        Side planks  3 x 5 sec B To failure x 3 To failure x 3    Sit to stand 2  x 10 2x10     Nu step X 10 min 10 minutes, level 2 10 minutes, level 2 10 minutes, level 2   Dead bugs 2 x 20 2x10 3x10 3 x 30 sec   Core exercises reverse bear crawl  3 x 20 sec 3x30\"  3 x 30 sec   Bridges with leg ext 2 x 10 B 10x10\" 10x10\"    PPU  10x10'' 10x10\" 10x10\"   Mountain climber against table  10x E 10xE    Bird dog  10x 10x X 20         MANUAL THERAPY: (- minutes):   Joint mobilization, Soft tissue mobilization, and Manipulation was utilized and necessary because of the patient's restricted joint motion, painful spasm, and loss of articular motion.       Treatment/Session Summary:    Treatment Assessment: Patient reports back is feeling better, but having some hip soreness following exercises.    Verbalized understanding of HEP and POC.      Communication/Consultation:  Therapy Evaluation sent to referring provider  Equipment provided today:  HEP  Recommendations/Intent for next treatment session: Next visit will focus on current LOF.    >Total Treatment Billable Duration:  45 minutes   Time In: 0315  Time Out: 0400    Rekha Beasley PTA         Charge Capture  [employer] [facility]  Appt Desk     Future Appointments   Date Time Provider Department Center   2/5/2024  4:00 PM Rekha Beasley PTA [facility]   2/8/2024  4:00 PM Rekha Beasley PTA [facility]

## 2024-02-05 ENCOUNTER — HOSPITAL ENCOUNTER (OUTPATIENT)
Dept: PHYSICAL THERAPY | Age: 24
Setting detail: RECURRING SERIES
Discharge: HOME | End: 2024-02-08
Payer: COMMERCIAL

## 2024-02-05 PROCEDURE — 97110 THERAPEUTIC EXERCISES: CPT

## 2024-02-05 NOTE — PROGRESS NOTES
Elizabeth Adelina  : 2000  Primary: Prieto Arnold (Lusi A ALEMAN)  Secondary:  [facility] @ [place] [address] [place] [address]  Phone: [phone]  Fax: [phone] Plan Frequency: 2-3x week  Plan of Care/Certification Expiration Date: 04/15/24        Plan of Care/Certification Expiration Date:  Plan of Care/Certification Expiration Date: 04/15/24    Frequency/Duration: Plan Frequency: 2-3x week      Time In/Out:   Time In: 0400  Time Out: 0445      PT Visit Info:    Plan Frequency: 2-3x week      Visit Count:  6    OUTPATIENT PHYSICAL THERAPY:   Treatment Note 2024       Episode  (low back pain)               Treatment Diagnosis:    No data found  Medical/Referring Diagnosis:    Low back pain, unspecified [M54.50]    Referring Physician:  Nuvia Landry APRN - NP MD Orders:  PT Eval and Treat   Return MD Appt:    Future Appointments   Date Time Provider Department Center   2024  4:00 PM Rekha Beasley, PTA [facility]          Date of Onset:  No data recorded   Allergies:   Patient has no known allergies.  Restrictions/Precautions:   None      Interventions Planned (Treatment may consist of any combination of the following):     See Assessment Note    Subjective Comments: My back was really hurting earlier today.  Initial Pain Level::     4 /10  Post Session Pain Level:       3 /10  Medications Last Reviewed:  2024  Updated Objective Findings:  None Today  Treatment   THERAPEUTIC EXERCISE: (45 minutes):    Exercises per grid below to improve mobility, strength, and balance.  Required minimal visual, verbal, and manual cues to promote proper body alignment, promote proper body posture, and promote proper body mechanics.  Progressed resistance, range, and complexity of movement as indicated.    RECOMMENDING STABILITY EXERCISES AND CORE     Date:  24 Date  24 Date  24         Activity/Exercise      POC and education       Hip Iso  10x10\" 10x10\"

## 2024-02-08 ENCOUNTER — HOSPITAL ENCOUNTER (OUTPATIENT)
Dept: PHYSICAL THERAPY | Age: 24
Setting detail: RECURRING SERIES
Discharge: HOME | End: 2024-02-11
Payer: COMMERCIAL

## 2024-02-08 PROCEDURE — 97110 THERAPEUTIC EXERCISES: CPT

## 2024-02-08 NOTE — PROGRESS NOTES
proper body posture, and promote proper body mechanics.  Progressed resistance, range, and complexity of movement as indicated.    RECOMMENDING STABILITY EXERCISES AND CORE     Date  1/31/24 Date  2/6/24 Date  2/8/24         Activity/Exercise      POC and education       Hip Iso  10x10\"     Planks forearms      Pallof Red 10x10\" Red 10x10\"    Clamshell with hold      Bridge with hold 10x10\" 10x10\" 10x10\"   Hamstring isometric vs table       Side planks       Sit to stand      Nu step 10 minutes, level 2 10 minutes, level 2 10 minutes, level 2   Dead bugs 3 x 30 sec 3 x 30 sec 3 x 30 sec   Core exercises reverse bear crawl  3 x 30 sec 3 x 30 sec    Bridges with leg ext   X  10   PPU 10x10\" 10x10\"    Mountain climber against table      Bird dog X 20 X 20 X 20         MANUAL THERAPY: (- minutes):   Joint mobilization, Soft tissue mobilization, and Manipulation was utilized and necessary because of the patient's restricted joint motion, painful spasm, and loss of articular motion.       Treatment/Session Summary:    Treatment Assessment: Patient tolerated treatment well today. Continues to show good improvement in overall strength.Instructed patient to continue home exercises as directed.    Verbalized understanding of HEP and POC.      Communication/Consultation:  Therapy Evaluation sent to referring provider  Equipment provided today:  HEP  Recommendations/Intent for next treatment session: Next visit will focus on current LOF.    >Total Treatment Billable Duration:  45 minutes   Time In: 0400  Time Out: 0445    Rekha Beasley PTA         Charge Capture  [employer] [facility]  Appt Desk     Future Appointments   Date Time Provider Department Center   2/21/2024  8:45 AM Rekha Beasley PTA [facility]   2/22/2024  4:00 PM Rekha Beasley PTA [facility]   2/26/2024  3:15 PM Rekha Mo PT [facility]   2/28/2024  3:15 PM Rekha Mo PT [facility]   3/4/2024  3:15 PM Rekha Beasley PTA [facility]

## 2024-02-21 ENCOUNTER — HOSPITAL ENCOUNTER (OUTPATIENT)
Dept: PHYSICAL THERAPY | Age: 24
Setting detail: RECURRING SERIES
Discharge: HOME | End: 2024-02-24
Payer: COMMERCIAL

## 2024-02-21 PROCEDURE — 97110 THERAPEUTIC EXERCISES: CPT

## 2024-02-21 NOTE — PROGRESS NOTES
Elizabeth Zhenguire  : 2000  Primary: Prieto Sc (Luis A ALEMAN)  Secondary:  [facility] @ [facility]  [address] [place] [address]  Phone: [phone]  Fax: [phone] Plan Frequency: 2-3x week  Plan of Care/Certification Expiration Date: 04/15/24        Plan of Care/Certification Expiration Date:  Plan of Care/Certification Expiration Date: 04/15/24    Frequency/Duration: Plan Frequency: 2-3x week      Time In/Out:   Time In: 0840  Time Out: 09      PT Visit Info:    Plan Frequency: 2-3x week      Visit Count:  8    OUTPATIENT PHYSICAL THERAPY:   Treatment Note 2024       Episode  (low back pain)               Treatment Diagnosis:    No data found  Medical/Referring Diagnosis:    Low back pain, unspecified [M54.50]    Referring Physician:  Nuvia Landry APRN - NP MD Orders:  PT Eval and Treat   Return MD Appt:    Future Appointments   Date Time Provider Department Center   2024  4:00 PM Rekha Beasley W, PTA [facility]   2024  3:15 PM Rekha Mo G, PT [facility]   2024  3:15 PM MoRekha cornell G, PT [facility]   3/4/2024  3:15 PM KingfisherAntoniettaly W, PTA [facility]   3/7/2024  4:00 PM RamosRekha W, PTA [facility]          Date of Onset:  No data recorded   Allergies:   Patient has no known allergies.  Restrictions/Precautions:   None      Interventions Planned (Treatment may consist of any combination of the following):     See Assessment Note    Subjective Comments:  *I was having a little bit.  I'm trying to remember when, and I can't remember when but I was having a little on my L side and not my R.  Overall it feels better.    Initial Pain Level::     1 /10  Post Session Pain Level:       1 /10  Medications Last Reviewed:  2024  Updated Objective Findings:  None Today  Treatment   THERAPEUTIC EXERCISE: (53 minutes):    Exercises per grid below to improve mobility, strength, and balance.  Required minimal visual, verbal, and manual

## 2024-02-22 ENCOUNTER — HOSPITAL ENCOUNTER (OUTPATIENT)
Dept: PHYSICAL THERAPY | Age: 24
Setting detail: RECURRING SERIES
Discharge: HOME | End: 2024-02-25
Payer: COMMERCIAL

## 2024-02-22 PROCEDURE — 97110 THERAPEUTIC EXERCISES: CPT

## 2024-02-22 NOTE — PROGRESS NOTES
Elizabeth Zhenguire  : 2000  Primary: Prieto Sc (Luis A ALEMAN)  Secondary:  [facility] @ [facility]  [address] [place] [address]  Phone: [phone]  Fax: [phone] Plan Frequency: 2-3x week  Plan of Care/Certification Expiration Date: 04/15/24        Plan of Care/Certification Expiration Date:  Plan of Care/Certification Expiration Date: 04/15/24    Frequency/Duration: Plan Frequency: 2-3x week      Time In/Out:   Time In: 0400  Time Out: 0445      PT Visit Info:    Plan Frequency: 2-3x week      Visit Count:  9    OUTPATIENT PHYSICAL THERAPY:   Treatment Note 2024       Episode  (low back pain)               Treatment Diagnosis:    No data found  Medical/Referring Diagnosis:    Low back pain, unspecified [M54.50]    Referring Physician:  Nuvia Landry APRN - NP MD Orders:  PT Eval and Treat   Return MD Appt:    Future Appointments   Date Time Provider Department Center   2024  3:15 PM Rekha Mo, PT [facility]   2024  3:15 PM Rekha Mo, PT [facility]   3/4/2024  3:15 PM OrlandoRekha low, PTA [facility]   3/7/2024  4:00 PM RamosRekha low W, PTA [facility]          Date of Onset:  No data recorded   Allergies:   Patient has no known allergies.  Restrictions/Precautions:   None      Interventions Planned (Treatment may consist of any combination of the following):     See Assessment Note    Subjective Comments:  *I was having a little bit.  I'm trying to remember when, and I can't remember when but I was having a little on my L side and not my R.  Overall it feels better.    Initial Pain Level::     1 /10  Post Session Pain Level:       1 /10  Medications Last Reviewed:  2024  Updated Objective Findings:  None Today  Treatment   THERAPEUTIC EXERCISE: (45 minutes):    Exercises per grid below to improve mobility, strength, and balance.  Required minimal visual, verbal, and manual cues to promote proper body alignment, promote proper

## 2024-02-26 ENCOUNTER — HOSPITAL ENCOUNTER (OUTPATIENT)
Dept: PHYSICAL THERAPY | Age: 24
Setting detail: RECURRING SERIES
Discharge: HOME | End: 2024-02-29
Payer: COMMERCIAL

## 2024-02-26 PROCEDURE — 97110 THERAPEUTIC EXERCISES: CPT

## 2024-02-26 NOTE — PROGRESS NOTES
Date:  2/21/24 Date  2/22/24 Date:  2.26.24          Activity/Exercise       POC and education        Hip Iso   10x10\"     Planks forearms  30\"x3 30\"x3 30\"X3   Pallof    Black 10x10\"   Clamshell with hold       Bridge with hold 10x10\" 10x10\"   10x10\"   Hamstring isometric vs table        Side planks   10x10\" 10x10\" 10x10\"   Sit to stand       Nu step 10 minutes, level 2 10 minutes, level 2 10 minutes, level 2 10 minutes level 2   Dead bugs 3 x 30 sec 3x30      Core exercises reverse bear crawl        Bridges with leg ext X  10 10x X 10    PPU       Mountain climber against table    10x   Bird dog X 20 20x     Open book   2 x 15 10x10   Corner stretch   X 10 30\"X3   Row    Black 10x10\"         MANUAL THERAPY: (- minutes):   Joint mobilization, Soft tissue mobilization, and Manipulation was utilized and necessary because of the patient's restricted joint motion, painful spasm, and loss of articular motion.       Treatment/Session Summary:    Treatment Assessment: Patient tolerated treatment well today. Less back pain following stretching.    Verbalized understanding of HEP and POC.      Communication/Consultation:  Therapy Evaluation sent to referring provider  Equipment provided today:  HEP  Recommendations/Intent for next treatment session: Next visit will focus on current LOF.    >Total Treatment Billable Duration:  40 minutes   Time In: 1520  Time Out: 1610    Rekha Mo PT         Charge Capture  [employer] Portal  Appt Desk     Future Appointments   Date Time Provider Department Center   2/28/2024  3:15 PM Rekha Mo PT [facility]   3/4/2024  3:15 PM Rekha Beasley PTA [facility]   3/7/2024  4:00 PM Rekha Beasley PTA [facility]

## 2024-02-28 ENCOUNTER — HOSPITAL ENCOUNTER (OUTPATIENT)
Dept: PHYSICAL THERAPY | Age: 24
Setting detail: RECURRING SERIES
Discharge: HOME | End: 2024-03-02
Payer: COMMERCIAL

## 2024-02-28 PROCEDURE — 97110 THERAPEUTIC EXERCISES: CPT

## 2024-02-28 NOTE — PROGRESS NOTES
Elizabeth Adelina  : 2000  Primary: Prieto Arnold (Luis A ALEMAN)  Secondary:  [facility] @ [place] [address] [place] [address]  Phone: [phone]  Fax: [phone] Plan Frequency: 2-3x week  Plan of Care/Certification Expiration Date: 04/15/24        Plan of Care/Certification Expiration Date:  Plan of Care/Certification Expiration Date: 04/15/24    Frequency/Duration: Plan Frequency: 2-3x week      Time In/Out:   Time In: 1520  Time Out: 1600      PT Visit Info:    Plan Frequency: 2-3x week      Visit Count:  11    OUTPATIENT PHYSICAL THERAPY:   Treatment Note 2024       Episode  (low back pain)               Treatment Diagnosis:    No data found  Medical/Referring Diagnosis:    Low back pain, unspecified [M54.50]    Referring Physician:  Nuvia Landry APRN - NP MD Orders:  PT Eval and Treat   Return MD Appt:    Future Appointments   Date Time Provider Department Center   3/4/2024  3:15 PM Rekha Beasley, PTA [facility]   3/7/2024  4:00 PM Rekha Beasley, PTA [facility]          Date of Onset:  No data recorded   Allergies:   Patient has no known allergies.  Restrictions/Precautions:   None      Interventions Planned (Treatment may consist of any combination of the following):     See Assessment Note    Subjective Comments: It is still helping.  I feel good.  Initial Pain Level::     1 /10  Post Session Pain Level:       1 /10  Medications Last Reviewed:  2024  Updated Objective Findings:  None Today  Treatment   THERAPEUTIC EXERCISE: (40 minutes):    Exercises per grid below to improve mobility, strength, and balance.  Required minimal visual, verbal, and manual cues to promote proper body alignment, promote proper body posture, and promote proper body mechanics.  Progressed resistance, range, and complexity of movement as indicated.    RECOMMENDING STABILITY EXERCISES AND CORE     Date  24 Date:  24 Date  24 Date:  24

## 2024-03-04 ENCOUNTER — HOSPITAL ENCOUNTER (OUTPATIENT)
Dept: PHYSICAL THERAPY | Age: 24
Setting detail: RECURRING SERIES
Discharge: HOME | End: 2024-03-07
Payer: COMMERCIAL

## 2024-03-04 PROCEDURE — 97110 THERAPEUTIC EXERCISES: CPT

## 2024-03-04 NOTE — PROGRESS NOTES
Elizabeth Adelina  : 2000  Primary: Prieto Arnold (Luis A ALEMAN)  Secondary:  [facility] @ [facility]  [address] [place] [address]  Phone: [phone]  Fax: [phone] Plan Frequency: 2-3x week  Plan of Care/Certification Expiration Date: 04/15/24        Plan of Care/Certification Expiration Date:  Plan of Care/Certification Expiration Date: 04/15/24    Frequency/Duration: Plan Frequency: 2-3x week      Time In/Out:   Time In: 5  Time Out: 0355      PT Visit Info:    Plan Frequency: 2-3x week      Visit Count:  12    OUTPATIENT PHYSICAL THERAPY:   Treatment Note 3/4/2024       Episode  (low back pain)               Treatment Diagnosis:    No data found  Medical/Referring Diagnosis:    Low back pain, unspecified [M54.50]    Referring Physician:  Nuvia Landry APRN - NP MD Orders:  PT Eval and Treat   Return MD Appt:    Future Appointments   Date Time Provider Department Center   3/7/2024  4:00 PM Rekha Beasley, PTA [facility]          Date of Onset:  No data recorded   Allergies:   Patient has no known allergies.  Restrictions/Precautions:   None      Interventions Planned (Treatment may consist of any combination of the following):     See Assessment Note    Subjective Comments: Patient reports doing pretty good. Just some really deep tenderness in the hip.  Initial Pain Level::     1 /10  Post Session Pain Level:       1 /10  Medications Last Reviewed:  3/4/2024  Updated Objective Findings:  None Today  Treatment   THERAPEUTIC EXERCISE: (40 minutes):    Exercises per grid below to improve mobility, strength, and balance.  Required minimal visual, verbal, and manual cues to promote proper body alignment, promote proper body posture, and promote proper body mechanics.  Progressed resistance, range, and complexity of movement as indicated.    RECOMMENDING STABILITY EXERCISES AND CORE     Date  24 Date:  24 Date:  24 Date  3/4/24

## 2024-03-05 SDOH — ECONOMIC STABILITY: INCOME INSECURITY: HOW HARD IS IT FOR YOU TO PAY FOR THE VERY BASICS LIKE FOOD, HOUSING, MEDICAL CARE, AND HEATING?: NOT HARD AT ALL

## 2024-03-05 SDOH — ECONOMIC STABILITY: FOOD INSECURITY: WITHIN THE PAST 12 MONTHS, THE FOOD YOU BOUGHT JUST DIDN'T LAST AND YOU DIDN'T HAVE MONEY TO GET MORE.: NEVER TRUE

## 2024-03-05 SDOH — ECONOMIC STABILITY: FOOD INSECURITY: WITHIN THE PAST 12 MONTHS, YOU WORRIED THAT YOUR FOOD WOULD RUN OUT BEFORE YOU GOT MONEY TO BUY MORE.: NEVER TRUE

## 2024-03-05 ASSESSMENT — PATIENT HEALTH QUESTIONNAIRE - PHQ9
SUM OF ALL RESPONSES TO PHQ QUESTIONS 1-9: 1
9. THOUGHTS THAT YOU WOULD BE BETTER OFF DEAD, OR OF HURTING YOURSELF: NOT AT ALL
SUM OF ALL RESPONSES TO PHQ QUESTIONS 1-9: 1
6. FEELING BAD ABOUT YOURSELF - OR THAT YOU ARE A FAILURE OR HAVE LET YOURSELF OR YOUR FAMILY DOWN: NOT AT ALL
SUM OF ALL RESPONSES TO PHQ QUESTIONS 1-9: 1
4. FEELING TIRED OR HAVING LITTLE ENERGY: 1
7. TROUBLE CONCENTRATING ON THINGS, SUCH AS READING THE NEWSPAPER OR WATCHING TELEVISION: NOT AT ALL
10. IF YOU CHECKED OFF ANY PROBLEMS, HOW DIFFICULT HAVE THESE PROBLEMS MADE IT FOR YOU TO DO YOUR WORK, TAKE CARE OF THINGS AT HOME, OR GET ALONG WITH OTHER PEOPLE: NOT DIFFICULT AT ALL
8. MOVING OR SPEAKING SO SLOWLY THAT OTHER PEOPLE COULD HAVE NOTICED. OR THE OPPOSITE - BEING SO FIDGETY OR RESTLESS THAT YOU HAVE BEEN MOVING AROUND A LOT MORE THAN USUAL: NOT AT ALL
3. TROUBLE FALLING OR STAYING ASLEEP: NOT AT ALL
2. FEELING DOWN, DEPRESSED OR HOPELESS: NOT AT ALL
SUM OF ALL RESPONSES TO PHQ QUESTIONS 1-9: 1
7. TROUBLE CONCENTRATING ON THINGS, SUCH AS READING THE NEWSPAPER OR WATCHING TELEVISION: 0
5. POOR APPETITE OR OVEREATING: NOT AT ALL
1. LITTLE INTEREST OR PLEASURE IN DOING THINGS: NOT AT ALL
SUM OF ALL RESPONSES TO PHQ9 QUESTIONS 1 & 2: 0
5. POOR APPETITE OR OVEREATING: 0
1. LITTLE INTEREST OR PLEASURE IN DOING THINGS: 0
4. FEELING TIRED OR HAVING LITTLE ENERGY: SEVERAL DAYS
8. MOVING OR SPEAKING SO SLOWLY THAT OTHER PEOPLE COULD HAVE NOTICED. OR THE OPPOSITE, BEING SO FIGETY OR RESTLESS THAT YOU HAVE BEEN MOVING AROUND A LOT MORE THAN USUAL: 0
2. FEELING DOWN, DEPRESSED OR HOPELESS: 0
SUM OF ALL RESPONSES TO PHQ QUESTIONS 1-9: 1
6. FEELING BAD ABOUT YOURSELF - OR THAT YOU ARE A FAILURE OR HAVE LET YOURSELF OR YOUR FAMILY DOWN: 0
10. IF YOU CHECKED OFF ANY PROBLEMS, HOW DIFFICULT HAVE THESE PROBLEMS MADE IT FOR YOU TO DO YOUR WORK, TAKE CARE OF THINGS AT HOME, OR GET ALONG WITH OTHER PEOPLE: 0
3. TROUBLE FALLING OR STAYING ASLEEP: 0

## 2024-03-06 ENCOUNTER — OFFICE VISIT (OUTPATIENT)
Dept: FAMILY MEDICINE CLINIC | Facility: CLINIC | Age: 24
End: 2024-03-06
Payer: COMMERCIAL

## 2024-03-06 VITALS
HEART RATE: 111 BPM | DIASTOLIC BLOOD PRESSURE: 80 MMHG | WEIGHT: 248 LBS | HEIGHT: 70 IN | SYSTOLIC BLOOD PRESSURE: 114 MMHG | BODY MASS INDEX: 35.5 KG/M2 | TEMPERATURE: 97.8 F

## 2024-03-06 DIAGNOSIS — G43.709 CHRONIC MIGRAINE WITHOUT AURA WITHOUT STATUS MIGRAINOSUS, NOT INTRACTABLE: ICD-10-CM

## 2024-03-06 DIAGNOSIS — R42 POSTURAL DIZZINESS WITH NEAR SYNCOPE: Primary | ICD-10-CM

## 2024-03-06 DIAGNOSIS — R55 POSTURAL DIZZINESS WITH NEAR SYNCOPE: Primary | ICD-10-CM

## 2024-03-06 PROCEDURE — 99214 OFFICE O/P EST MOD 30 MIN: CPT | Performed by: NURSE PRACTITIONER

## 2024-03-06 RX ORDER — VITAMIN B COMPLEX
1 CAPSULE ORAL DAILY
COMMUNITY

## 2024-03-06 RX ORDER — MAGNESIUM GLUCONATE 27 MG(500)
500 TABLET ORAL 2 TIMES DAILY
COMMUNITY

## 2024-03-06 RX ORDER — ATOGEPANT 60 MG/1
60 TABLET ORAL DAILY
Qty: 90 TABLET | Refills: 1 | Status: SHIPPED | OUTPATIENT
Start: 2024-03-06

## 2024-03-06 ASSESSMENT — ENCOUNTER SYMPTOMS
ABDOMINAL PAIN: 1
BACK PAIN: 1

## 2024-03-06 NOTE — PROGRESS NOTES
Subjective:      Patient ID: Elizabeth Sahu is a 24 y.o. female.   She was seeing PT for back pain and they have witnessed several episodes of vision loss and balance issues standing up and they want her to see a cardiologist for postural hypotension before proceeding with PT for her back. States had an abnormal EKG in the past at [facility] and was told should see cardiology but never went.   Also she gets migraine very bad and when she has one her pulse is very loud in her head and if she rolls over the blood rushes to her head and it throbs it happens also when she sings something.  She gets migraines twice a week. She just takes tylenol. Her GI told her to avoid NSAID.  No prodrome for the migraines. Her aunt just went to see a sarah Md for her migraines and she got information from her and she thinks she has atypical migraines like her aunt the headaches last 2 days. Is light and sound sensitive and the pain is on the right side of her head. Feels like right side of head is in a steel vice and someone is kicking her in the head with a steel toed boot. Has been on med in the past to prevent migraines but does not recall name was a pill   She is concerned that she has chaitanya- danlos  as she has sleep apnea, gastroparesis, and has the ability to touch her thumb to her wrist. Thinks that might explain everything that is wrong with her.   HPI    Review of Systems   Constitutional:  Positive for fatigue.   Gastrointestinal:  Positive for abdominal pain.   Musculoskeletal:  Positive for back pain and neck pain.   Neurological:  Positive for dizziness and syncope (near with standing).       Objective:   Physical Exam  Vitals and nursing note reviewed.   Constitutional:       Appearance: She is obese.   Cardiovascular:      Rate and Rhythm: Normal rate and regular rhythm.      Pulses: Normal pulses.      Heart sounds: Normal heart sounds.   Pulmonary:      Effort: Pulmonary effort is normal.      Breath sounds: Normal

## 2024-03-07 ENCOUNTER — HOSPITAL ENCOUNTER (OUTPATIENT)
Dept: PHYSICAL THERAPY | Age: 24
Setting detail: RECURRING SERIES
End: 2024-03-07
Payer: COMMERCIAL

## 2024-03-07 NOTE — PROGRESS NOTES
Elizabeth Sahu  : 2000  Primary: [facility]  Secondary:  [facility] @ [address] [place] [address]  Phone: [phone]  Fax: [phone] Plan Frequency: 2-3x week    Plan of Care/Certification Expiration Date: 04/15/24      PT Visit Info:  No data recorded       OUTPATIENT PHYSICAL THERAPY 3/7/2024     Appt Desk   Episode   MyChart      Ms. Sahu cancelled appointment due to schedule conflict.       Rekha Beasley PTA    Future Appointments   Date Time Provider Department Center   3/7/2024  7:00 PM Rekha Beasley PTA [facility]   3/12/2024 11:00 AM Kamar Moeller III, MD [facility]

## 2024-03-12 ENCOUNTER — INITIAL CONSULT (OUTPATIENT)
Age: 24
End: 2024-03-12
Payer: COMMERCIAL

## 2024-03-12 VITALS
WEIGHT: 246.1 LBS | DIASTOLIC BLOOD PRESSURE: 82 MMHG | SYSTOLIC BLOOD PRESSURE: 118 MMHG | BODY MASS INDEX: 36.45 KG/M2 | HEART RATE: 104 BPM | HEIGHT: 69 IN

## 2024-03-12 DIAGNOSIS — R42 DIZZINESS: Primary | ICD-10-CM

## 2024-03-12 PROCEDURE — 99214 OFFICE O/P EST MOD 30 MIN: CPT | Performed by: INTERNAL MEDICINE

## 2024-03-12 PROCEDURE — 93000 ELECTROCARDIOGRAM COMPLETE: CPT | Performed by: INTERNAL MEDICINE

## 2024-03-12 ASSESSMENT — ENCOUNTER SYMPTOMS
SHORTNESS OF BREATH: 0
ABDOMINAL PAIN: 0

## 2024-03-12 NOTE — PROGRESS NOTES
10:38 AM     02/20/2023 10:38 AM    CO2 26 02/20/2023 10:38 AM    BUN 10 02/20/2023 10:38 AM    CREATININE 0.90 02/20/2023 10:38 AM    GLUCOSE 94 02/20/2023 10:38 AM    CALCIUM 9.5 02/20/2023 10:38 AM          EKG  Sinus rhythm    OUTSIDE RECORDS REVIEW    Records from outside providers have been reviewed and summarized as noted in the HPI, past history and data review sections of this note, and reviewed with patient. .       ASSESSMENT and PLAN    Elizabeth was seen today for consultation and dizziness.    Diagnoses and all orders for this visit:    Dizziness  -     EKG 12 lead  -     Echo (TTE) complete (PRN contrast/bubble/strain/3D); Future  -     Tilt table; Future          IMPRESSION:    Ms. Sahu presents today for postural dizziness.  Symptoms are most consistent with POTS likely.  Sending her for an echo to rule out any structural or functional abnormalities.  Arranging a tilt table test.  Will see her back after her tilt table test and we can review treatment options pending results        No follow-ups on file.          Thank you for allowing me to participate in this patient's care.  Please call or contact me if there are any questions or concerns regarding the above.      Kamar Moeller III, MD  03/12/24  11:32 AM

## 2024-03-26 NOTE — PROGRESS NOTES
Patient pre-assessment complete for Tilt scheduled for 3/27, arrival time 1000. Patient verified using . Patient instructed to bring a list of all home medications on the day of procedure. NPO status reinforced. Instructed they can take all other medications excluding vitamins & supplements. Patient verbalizes understanding of all instructions & denies any questions at this time.

## 2024-03-27 ENCOUNTER — HOSPITAL ENCOUNTER (OUTPATIENT)
Dept: CARDIAC CATH/INVASIVE PROCEDURES | Age: 24
Discharge: HOME | End: 2024-03-27
Attending: INTERNAL MEDICINE | Admitting: INTERNAL MEDICINE
Payer: COMMERCIAL

## 2024-03-27 VITALS
OXYGEN SATURATION: 97 % | DIASTOLIC BLOOD PRESSURE: 80 MMHG | TEMPERATURE: 98.1 F | RESPIRATION RATE: 16 BRPM | HEART RATE: 89 BPM | SYSTOLIC BLOOD PRESSURE: 115 MMHG

## 2024-03-27 DIAGNOSIS — R42 DIZZINESS: ICD-10-CM

## 2024-03-27 LAB
TILT CV INITIAL SUPINE HEART RATE: 87 BPM
TILT CV INITIAL TILT HEART RATE: 97 BPM
TILT CV MAX BP BLOOD PRESSURE: NORMAL MMHG
TILT CV MAX HEART RATE: 144 BPM
TILT CV MINIMUM BP BLOOD PRESSURE: NORMAL MMHG
TILT CV MINIMUM HR HEART RATE: 80 BPM

## 2024-03-27 PROCEDURE — 6370000000 HC RX 637 (ALT 250 FOR IP): Performed by: INTERNAL MEDICINE

## 2024-03-27 PROCEDURE — 93660 TILT TABLE EVALUATION: CPT | Performed by: INTERNAL MEDICINE

## 2024-03-27 PROCEDURE — 93660 TILT TABLE EVALUATION: CPT

## 2024-03-27 RX ORDER — NITROGLYCERIN 400 UG/1
SPRAY ORAL PRN
Status: COMPLETED | OUTPATIENT
Start: 2024-03-27 | End: 2024-03-27

## 2024-03-27 RX ADMIN — NITROGLYCERIN 1 SPRAY: 400 SPRAY ORAL at 11:19

## 2024-03-27 NOTE — DISCHARGE INSTRUCTIONS
After your tilt table test    Be sure someone else drives you home.     You have no restrictions:   Return to your previous diet.  Increase your fluid & salt intake   Return to your previous activities.   Continue current medications       Call your doctor if:    You have trouble breathing all of a sudden.  You have chest pain or any pain that spreads to your neck, jaw, or arms.  You have questions or concerns.  You have a fever greater than 101°F.  You have increase in dizzy or fainting spells    Doctor: Sajan [phone]    Special Instructions:

## 2024-03-27 NOTE — PROGRESS NOTES
Patient received to [facility] room # 16  Ambulatory from [facility]. Patient scheduled for tilt today with Dr bello. Procedure reviewed & questions answered, voiced good understanding consent obtained & placed on chart. All medications and medical history reviewed. Will prep patient per orders. Patient & family updated on plan of care.      The patient has a fraility score of 3-MANAGING WELL, based on ambulation without assistance.

## 2024-03-27 NOTE — PROGRESS NOTES
Discharge instructions given per orders, voiced good understanding of  care, medications & follow up care. Denies any questions

## 2024-03-27 NOTE — PROGRESS NOTES
Patient received to [facility] room # 16  Ambulatory from [facility]. Patient scheduled for Tilt Table today with Dr Ratliff. Procedure reviewed & questions answered, voiced good understanding consent obtained & placed on chart. All medications and medical history reviewed. Will prep patient per orders. Patient & family updated on plan of care.      The patient has a fraility score of 3-MANAGING WELL, based on reports no recent falls..

## 2024-03-27 NOTE — PROGRESS NOTES
Tilt table  Test positive after ntg spray.    Pt flushed, nauseated and had tunnel vision  Max , lowest BP 96/65-pt symptomatic

## 2024-04-23 ENCOUNTER — OFFICE VISIT (OUTPATIENT)
Age: 24
End: 2024-04-23
Payer: COMMERCIAL

## 2024-04-23 VITALS
SYSTOLIC BLOOD PRESSURE: 122 MMHG | DIASTOLIC BLOOD PRESSURE: 82 MMHG | HEIGHT: 69 IN | WEIGHT: 243 LBS | BODY MASS INDEX: 35.99 KG/M2 | HEART RATE: 84 BPM

## 2024-04-23 DIAGNOSIS — G90.A POTS (POSTURAL ORTHOSTATIC TACHYCARDIA SYNDROME): Primary | ICD-10-CM

## 2024-04-23 PROCEDURE — 99214 OFFICE O/P EST MOD 30 MIN: CPT | Performed by: INTERNAL MEDICINE

## 2024-04-23 ASSESSMENT — ENCOUNTER SYMPTOMS
SHORTNESS OF BREATH: 0
ABDOMINAL PAIN: 0

## 2024-04-23 NOTE — PROGRESS NOTES
meals)  Patient taking differently: Take 1 tablet by mouth 2 times daily as needed 1/9/24  Yes Nuvia Landry APRN - NP   famotidine (PEPCID) 40 MG tablet Take 1 tablet by mouth every evening 11/9/23  Yes Nuvia Landry APRN - NP   ondansetron (ZOFRAN-ODT) 4 MG disintegrating tablet Take 1 tablet by mouth every 8 hours as needed for Nausea or Vomiting 11/9/23  Yes Nuvia Landry APRN - NP   RABEprazole (ACIPHEX) 20 MG tablet Take 1 tablet by mouth daily 12/12/22  Yes Caryn Bowers DO   buPROPion (WELLBUTRIN XL) 300 MG extended release tablet Take 1 tablet by mouth daily 12/30/20  Yes Kike Martins APRN - NP   norethindrone-ethinyl estradiol (JUNEL FE 1/20) 1-20 MG-MCG per tablet Take 1 tablet by mouth daily 5/6/20  Yes Gladys Berg MD     No Known Allergies  Past Medical History:   Diagnosis Date    Acid reflux 2010    empiric dilation 2/2023    Anxiety and depression     f/w psych    Gastroparesis 2017    Hives     Migraines 2020    resolved on their own 2021    ZEKE on CPAP     moderate 1/2022. f/w sleep med     Past Surgical History:   Procedure Laterality Date    CHOLECYSTECTOMY, LAPAROSCOPIC  02/2017     Family History   Problem Relation Age of Onset    Migraines Mother     Hypertension Father     Migraines Sister      Social History     Tobacco Use    Smoking status: Never    Smokeless tobacco: Never   Substance Use Topics    Alcohol use: Not on file       ROS:    Review of Systems   Constitutional: Negative for chills, diaphoresis and fever.   HENT:  Negative for hearing loss.    Eyes:  Negative for visual disturbance.   Cardiovascular:         As per the HPI   Respiratory:  Negative for shortness of breath.    Hematologic/Lymphatic: Does not bruise/bleed easily.   Gastrointestinal:  Negative for abdominal pain.   Genitourinary:  Negative for dysuria.   Neurological:  Positive for dizziness. Negative for focal weakness.   Psychiatric/Behavioral:  Negative for suicidal ideas.

## 2024-07-23 ENCOUNTER — OFFICE VISIT (OUTPATIENT)
Age: 24
End: 2024-07-23
Payer: COMMERCIAL

## 2024-07-23 VITALS
SYSTOLIC BLOOD PRESSURE: 120 MMHG | WEIGHT: 242.8 LBS | BODY MASS INDEX: 35.96 KG/M2 | DIASTOLIC BLOOD PRESSURE: 76 MMHG | HEIGHT: 69 IN | HEART RATE: 74 BPM

## 2024-07-23 DIAGNOSIS — G90.A POTS (POSTURAL ORTHOSTATIC TACHYCARDIA SYNDROME): Primary | ICD-10-CM

## 2024-07-23 PROCEDURE — 99214 OFFICE O/P EST MOD 30 MIN: CPT | Performed by: INTERNAL MEDICINE

## 2024-07-23 RX ORDER — ERGOCALCIFEROL 1.25 MG/1
50000 CAPSULE ORAL WEEKLY
COMMUNITY
Start: 2024-05-30

## 2024-07-23 RX ORDER — FERROUS SULFATE 325(65) MG
325 TABLET ORAL
COMMUNITY

## 2024-07-23 ASSESSMENT — ENCOUNTER SYMPTOMS
ABDOMINAL PAIN: 0
SHORTNESS OF BREATH: 0

## 2024-07-23 NOTE — PROGRESS NOTES
[facility] [[email or web address]] CARDIOLOGY  [address] [place] [address], [address]  [place] [address]  PHONE: [phone]      24    NAME:  Elizabeth Sahu  : 2000  MRN: [id number]         SUBJECTIVE:   Elizabeth Sahu is a 24 y.o. female seen for a follow up visit regarding the following:     Chief Complaint   Patient presents with    Dizziness    Follow-up            HPI:  Follow up  Dizziness and Follow-up   .    Ms. Sahu presents today for follow-up.  Patient reports that although we prescribed the ivabradine at her last visit, she was never called by her pharmacy that it was ready, she wonders whether he could have an insurance issue.  She does note that since I saw her last, she has been working hard to increase her sodium intake, has been using electrolyte supplementation and avoiding prolonged heat exposure.  Says this has helped significantly and she feels much better.  She has if she gets off of her electrolyte supplement, she feels worse fairly quickly.  No new complaints today    Dizziness  Pertinent negatives include no abdominal pain, chills, diaphoresis or fever.                       Past Medical History, Past Surgical History, Family history, Social History, and Medications were all reviewed with the patient today and updated as necessary.     Prior to Admission medications    Medication Sig Start Date End Date Taking? Authorizing Provider   vitamin D (ERGOCALCIFEROL) 1.25 MG (55765 UT) CAPS capsule Take 1 capsule by mouth Once a week at 5 PM 24  Yes Lexi Lara MD   ferrous sulfate (IRON 325) 325 (65 Fe) MG tablet Take 1 tablet by mouth daily (with breakfast)   Yes Lexi Lara MD   magnesium gluconate (MAGONATE) 500 MG tablet Take 1 tablet by mouth 2 times daily   Yes Lexi Lara MD   b complex vitamins capsule Take 1 capsule by mouth daily   Yes Lexi Lara MD   Atogepant (QULIPTA) 60 MG TABS Take 60 mg by mouth daily 3/6/24  Yes Nuvia Landry APRN - JANETT

## 2025-01-23 ENCOUNTER — OFFICE VISIT (OUTPATIENT)
Age: 25
End: 2025-01-23
Payer: COMMERCIAL

## 2025-01-23 VITALS
WEIGHT: 260 LBS | HEART RATE: 104 BPM | DIASTOLIC BLOOD PRESSURE: 72 MMHG | BODY MASS INDEX: 38.51 KG/M2 | SYSTOLIC BLOOD PRESSURE: 118 MMHG | HEIGHT: 69 IN

## 2025-01-23 DIAGNOSIS — G90.A POTS (POSTURAL ORTHOSTATIC TACHYCARDIA SYNDROME): Primary | ICD-10-CM

## 2025-01-23 PROCEDURE — 99214 OFFICE O/P EST MOD 30 MIN: CPT | Performed by: INTERNAL MEDICINE

## 2025-01-23 RX ORDER — LEVONORGESTREL 52 MG/1
1 INTRAUTERINE DEVICE INTRAUTERINE ONCE
COMMUNITY

## 2025-01-23 ASSESSMENT — ENCOUNTER SYMPTOMS
SHORTNESS OF BREATH: 0
ABDOMINAL PAIN: 0

## 2025-01-23 NOTE — PROGRESS NOTES
CALCIUM 9.5 02/20/2023 10:38 AM          EKG        CXR/IMAGING        DEVICE INTERROGATION        OUTSIDE RECORDS REVIEW    Records from outside providers have been reviewed and summarized as noted in the HPI, past history and data review sections of this note, and reviewed with patient. .       ASSESSMENT and PLAN    Elizabeth was seen today for pots (postural orthostatic tachycardia syndrome).    Diagnoses and all orders for this visit:    POTS (postural orthostatic tachycardia syndrome)            IMPRESSION:    Ms. Sahu presents today for follow-up.  She stable from cardiac standpoint today.  Much improved with behavior modifications.  Since she has done better with just increasing her electrolyte intake. I think managing it this way as opposed to pharmacotherapy would be preferred.  Will plan to continue with this line of treatment. Will see her back in 6 months    Personal time spent with chart review, interview/physical examination and documentation totals 32 minutes.         No follow-ups on file.          Thank you for allowing me to participate in this patient's care.  Please call or contact me if there are any questions or concerns regarding the above.      Kamar Moeller III, MD  01/23/25  9:14 AM

## 2025-07-22 ENCOUNTER — OFFICE VISIT (OUTPATIENT)
Age: 25
End: 2025-07-22
Payer: COMMERCIAL

## 2025-07-22 VITALS
HEIGHT: 69 IN | HEART RATE: 105 BPM | WEIGHT: 261 LBS | DIASTOLIC BLOOD PRESSURE: 88 MMHG | SYSTOLIC BLOOD PRESSURE: 132 MMHG | BODY MASS INDEX: 38.66 KG/M2

## 2025-07-22 DIAGNOSIS — E78.00 HYPERCHOLESTEROLEMIA: Primary | ICD-10-CM

## 2025-07-22 DIAGNOSIS — G47.33 OSA (OBSTRUCTIVE SLEEP APNEA): ICD-10-CM

## 2025-07-22 DIAGNOSIS — G47.34 NOCTURNAL HYPOXEMIA: ICD-10-CM

## 2025-07-22 PROCEDURE — 99213 OFFICE O/P EST LOW 20 MIN: CPT | Performed by: INTERNAL MEDICINE

## 2025-07-22 PROCEDURE — 93000 ELECTROCARDIOGRAM COMPLETE: CPT | Performed by: INTERNAL MEDICINE

## 2025-07-22 ASSESSMENT — ENCOUNTER SYMPTOMS
ABDOMINAL PAIN: 0
SHORTNESS OF BREATH: 0

## 2025-07-22 NOTE — PROGRESS NOTES
[facility] CARDIOLOGY  [address] [place] [address], [address]  [place] [address]  PHONE: [phone]      25    NAME:  Elizabeth Sahu  : 2000  MRN: [id number]         SUBJECTIVE:   Elizabeth Sahu is a 25 y.o. female seen for a follow up visit regarding the following:     Chief Complaint   Patient presents with    POTS (postural orthostatic tachycardia syndrome)            HPI:  Follow up  POTS (postural orthostatic tachycardia syndrome)   .    Ms. Sahu presents today for follow-up.  She had been doing well.  No acute complaints today.  Denies chest pain, shortness of breath, orthopnea, PND palpitations, syncope and lower extremity swelling.    Dizziness  Pertinent negatives include no abdominal pain, chills, diaphoresis or fever.       Past Medical History, Past Surgical History, Family history, Social History, and Medications were all reviewed with the patient today and updated as necessary.     Prior to Admission medications    Medication Sig Start Date End Date Taking? Authorizing Provider   levonorgestrel (MIRENA, 52 MG,) IUD 52 mg 1 each by IntraUTERine route once   Yes Lexi Lara MD   vitamin D (ERGOCALCIFEROL) 1.25 MG (90756 UT) CAPS capsule Take 1 capsule by mouth Once a week at 5 PM 24  Yes Lexi Lara MD   ferrous sulfate (IRON 325) 325 (65 Fe) MG tablet Take 1 tablet by mouth daily (with breakfast)   Yes Lexi Lara MD   b complex vitamins capsule Take 1 capsule by mouth daily   Yes Lexi Lara MD   famotidine (PEPCID) 40 MG tablet Take 1 tablet by mouth every evening 23  Yes Nuvia Landry APRN - NP   ondansetron (ZOFRAN-ODT) 4 MG disintegrating tablet Take 1 tablet by mouth every 8 hours as needed for Nausea or Vomiting 23  Yes Nuvia Landry APRN - NP   RABEprazole (ACIPHEX) 20 MG tablet Take 1 tablet by mouth daily 22  Yes Caryn Bowers DO   buPROPion (WELLBUTRIN XL) 300 MG extended release tablet Take 1 tablet